# Patient Record
Sex: FEMALE | Race: BLACK OR AFRICAN AMERICAN | Employment: FULL TIME | URBAN - METROPOLITAN AREA
[De-identification: names, ages, dates, MRNs, and addresses within clinical notes are randomized per-mention and may not be internally consistent; named-entity substitution may affect disease eponyms.]

---

## 2017-02-09 DIAGNOSIS — D50.9 IRON DEFICIENCY ANEMIA, UNSPECIFIED IRON DEFICIENCY ANEMIA TYPE: ICD-10-CM

## 2017-02-09 DIAGNOSIS — E89.0 HYPOTHYROIDISM FOLLOWING RADIOIODINE THERAPY: ICD-10-CM

## 2017-02-09 DIAGNOSIS — Z98.84 STATUS POST GASTRIC BYPASS FOR OBESITY: Primary | ICD-10-CM

## 2017-02-15 ENCOUNTER — OFFICE VISIT (OUTPATIENT)
Dept: INTERNAL MEDICINE CLINIC | Age: 42
End: 2017-02-15

## 2017-02-15 VITALS
BODY MASS INDEX: 37.45 KG/M2 | DIASTOLIC BLOOD PRESSURE: 70 MMHG | WEIGHT: 233 LBS | HEART RATE: 95 BPM | RESPIRATION RATE: 16 BRPM | SYSTOLIC BLOOD PRESSURE: 110 MMHG | HEIGHT: 66 IN | OXYGEN SATURATION: 99 % | TEMPERATURE: 98 F

## 2017-02-15 DIAGNOSIS — J01.01 ACUTE RECURRENT MAXILLARY SINUSITIS: Primary | ICD-10-CM

## 2017-02-15 RX ORDER — AZITHROMYCIN 250 MG/1
TABLET, FILM COATED ORAL
Qty: 6 TAB | Refills: 0 | Status: SHIPPED | OUTPATIENT
Start: 2017-02-15 | End: 2017-07-13 | Stop reason: ALTCHOICE

## 2017-02-15 NOTE — PATIENT INSTRUCTIONS
It was a pleasure to see you! As discussed:    Upper respiratory tract infection (URI)  You have a bacterial URI. - Take the antibiotics as prescribed. -Use nasal saline spray 3-4 times/day  -Start taking a non sedating antihistamine such as Claritin, Allegra or Zyrtec (generic is fine)  For your  Symptoms:  -Sore throat: Cepacol or similar lozenges, Salt water gargles  -Itching: Thick creams/ lotions; Nonsedating antihistamine such as Allegra, Zyrtec, or Claritin (generic is fine)  -Pain/ Aches: You can use Ibuprofen (no more than 2400 mg/day) or Aleve (no more than 880mg/ day) as needed. Do not use any other NSAIDs while on this medication. Do not use NSAIDs if you have high blood pressure or history of ulcers or abnormal bleeding. OR   -Take Tylenol as needed for headache and body aches (every 4-8 hours, do not use more than 3000mg in one day)       Upper Respiratory Infection (Cold): Care Instructions  Your Care Instructions    An upper respiratory infection, or URI, is an infection of the nose, sinuses, or throat. URIs are spread by coughs, sneezes, and direct contact. The common cold is the most frequent kind of URI. The flu and sinus infections are other kinds of URIs. Almost all URIs are caused by viruses. Antibiotics won't cure them. But you can treat most infections with home care. This may include drinking lots of fluids and taking over-the-counter pain medicine. You will probably feel better in 4 to 10 days. The doctor has checked you carefully, but problems can develop later. If you notice any problems or new symptoms, get medical treatment right away. Follow-up care is a key part of your treatment and safety. Be sure to make and go to all appointments, and call your doctor if you are having problems. It's also a good idea to know your test results and keep a list of the medicines you take. How can you care for yourself at home?   · To prevent dehydration, drink plenty of fluids, enough so that your urine is light yellow or clear like water. Choose water and other caffeine-free clear liquids until you feel better. If you have kidney, heart, or liver disease and have to limit fluids, talk with your doctor before you increase the amount of fluids you drink. · Take an over-the-counter pain medicine, such as acetaminophen (Tylenol), ibuprofen (Advil, Motrin), or naproxen (Aleve). Read and follow all instructions on the label. · Before you use cough and cold medicines, check the label. These medicines may not be safe for young children or for people with certain health problems. · Be careful when taking over-the-counter cold or flu medicines and Tylenol at the same time. Many of these medicines have acetaminophen, which is Tylenol. Read the labels to make sure that you are not taking more than the recommended dose. Too much acetaminophen (Tylenol) can be harmful. · Get plenty of rest.  · Do not smoke or allow others to smoke around you. If you need help quitting, talk to your doctor about stop-smoking programs and medicines. These can increase your chances of quitting for good. When should you call for help? Call 911 anytime you think you may need emergency care. For example, call if:  · You have severe trouble breathing. Call your doctor now or seek immediate medical care if:  · You seem to be getting much sicker. · You have new or worse trouble breathing. · You have a new or higher fever. · You have a new rash. Watch closely for changes in your health, and be sure to contact your doctor if:  · You have a new symptom, such as a sore throat, an earache, or sinus pain. · You cough more deeply or more often, especially if you notice more mucus or a change in the color of your mucus. · You do not get better as expected. Where can you learn more? Go to http://donna-gustavo.info/.   Enter G726 in the search box to learn more about \"Upper Respiratory Infection (Cold): Care Instructions. \"  Current as of: June 30, 2016  Content Version: 11.1  © 6732-4537 [employer], [facility]. Care instructions adapted under license by [employer] (which disclaims liability or warranty for this information). If you have questions about a medical condition or this instruction, always ask your healthcare professional. [place] [address] any warranty or liability for your use of this information.

## 2017-02-15 NOTE — LETTER
NOTIFICATION RETURN TO WORK / SCHOOL 
 
2/15/2017 1:33 PM 
 
Ms. Veena Madden [place] [address] [facility] [address] To Whom It May Concern: 
 
Veena Madden is currently under the care of [facility]. She will return to work/school on: 2/17/17 If there are questions or concerns please have the patient contact our office. Sincerely, Conchis Sheppard MD

## 2017-02-15 NOTE — PROGRESS NOTES
HISTORY OF PRESENT ILLNESS  Sanjuana Lockwood is a 46 y.o. female. Cold Symptoms   The current episode started more than 2 days ago. The cough is productive of purulent sputum. There has been no fever. Associated symptoms include rhinorrhea and sore throat. Pertinent negatives include no chest pain, no chills, no ear pain, no headaches, no shortness of breath, no wheezing, no nausea and no vomiting. Treatments tried: Robitussin  Risk factors: sick contacts at work. She is not a smoker. Past medical history comments: sinusitis . Review of Systems   Constitutional: Negative for chills. HENT: Positive for rhinorrhea and sore throat. Negative for ear pain. Respiratory: Negative for shortness of breath and wheezing. Cardiovascular: Negative for chest pain. Gastrointestinal: Negative for nausea and vomiting. Neurological: Negative for headaches. Patient Active Problem List    Diagnosis Date Noted    Iron deficiency anemia 10/29/2015    Hypothyroidism following radioiodine therapy 04/21/2015    Status post gastric bypass for obesity 01/27/2011    Heartburn 01/27/2011    Headache(784.0) 01/27/2011    Chronic low back pain 01/27/2011       Current Outpatient Prescriptions   Medication Sig Dispense Refill    levothyroxine (SYNTHROID) 50 mcg tablet TAKE THREE TABLETS BY MOUTH MOST DAYS AND TAKE TWO TABLETS ON MONDAY, WEDNESDAY, FRIDAY AVERAGE DOSE .5 MCG/DAY 90 Tab 6    cetirizine (ZYRTEC) 10 mg tablet Take 1 Tab by mouth daily. 30 Tab 0    norgestimate-ethinyl estradiol (SPRINTEC, 28,) 0.25-35 mg-mcg per tablet Take 1 Tab by mouth daily. 1 Each 0    ergocalciferol (ERGOCALCIFEROL) 50,000 unit capsule Take 1 Cap by mouth two (2) times a week on Wednesday and Saturday. 24 Cap 3    ascorbic acid (VITAMIN C) 500 mg tablet Take  by mouth.  MULTIVITAMINS W-IRON (MULTI-VITAMIN WITH IRON PO) Take  by mouth.       azithromycin (ZITHROMAX) 250 mg tablet Take 1 Tab by mouth See Admin Instructions. 6 Tab 0    guaiFENesin-codeine (CHERATUSSIN AC) 100-10 mg/5 mL solution Take 5 mL by mouth three (3) times daily as needed for Cough. Max Daily Amount: 15 mL. 118 mL 0    cyclobenzaprine (FLEXERIL) 5 mg tablet Take 1 Tab by mouth three (3) times daily as needed for Muscle Spasm(s). Take first dose at night 30 Tab 1    butalbital-acetaminophen-caffeine (FIORICET, ESGIC) -40 mg per tablet Take 1 Tab by mouth every eight (8) hours as needed for Pain. Max Daily Amount: 3 Tabs. Try 1st dose at night  Indications: TENSION-TYPE HEADACHE 30 Tab 0       Allergies   Allergen Reactions    Levothyroxine Rash     Reacts to dye in high dose tablets    Sulfa (Sulfonamide Antibiotics) Hives      Visit Vitals    /70 (BP 1 Location: Right arm, BP Patient Position: Sitting)    Pulse 95    Temp 98 °F (36.7 °C) (Oral)    Resp 16    Ht 5' 6\" (1.676 m)    Wt 233 lb (105.7 kg)    SpO2 99%    BMI 37.61 kg/m2       Physical Exam   Constitutional: She is oriented to person, place, and time. She appears well-developed. No distress. HENT:   Nose: Mucosal edema present. No rhinorrhea or septal deviation. Right sinus exhibits no maxillary sinus tenderness and no frontal sinus tenderness. Left sinus exhibits no maxillary sinus tenderness and no frontal sinus tenderness. Eyes: Conjunctivae are normal.   Neck: Neck supple. Cardiovascular: Normal rate, regular rhythm and normal heart sounds. Pulmonary/Chest: Effort normal and breath sounds normal. No respiratory distress. She has no wheezes. She has no rales. She exhibits no tenderness. Lymphadenopathy:     She has no cervical adenopathy. Neurological: She is alert and oriented to person, place, and time. Psychiatric: She has a normal mood and affect. ASSESSMENT and PLAN  Po Perez was seen today for sinus infection.  Likely bacterial given s/s     Diagnoses and all orders for this visit:    Acute recurrent maxillary sinusitis  Upper respiratory tract infection (URI)  You have a bacterial URI. - Take the antibiotics as prescribed. -Use nasal saline spray 3-4 times/day  -Start taking a non sedating antihistamine such as Claritin, Allegra or Zyrtec (generic is fine)  For your  Symptoms:  -Sore throat: Cepacol or similar lozenges, Salt water gargles  -Itching: Thick creams/ lotions; Nonsedating antihistamine such as Allegra, Zyrtec, or Claritin (generic is fine)  -Pain/ Aches: You can use Ibuprofen (no more than 2400 mg/day) or Aleve (no more than 880mg/ day) as needed. Do not use any other NSAIDs while on this medication. Do not use NSAIDs if you have high blood pressure or history of ulcers or abnormal bleeding. OR   -Take Tylenol as needed for headache and body aches (every 4-8 hours, do not use more than 3000mg in one day)    -     azithromycin (ZITHROMAX) 250 mg tablet; Day 1 take 2 tabs by mouth; Days 2-5 take one tab by mouth a day      Follow-up Disposition:  Return if symptoms worsen or fail to improve. Medication risks/benefits/costs/interactions/alternatives discussed with patient. Fer Wileykins  was given an after visit summary which includes diagnoses, current medications, & vitals. she expressed understanding with the diagnosis and plan.

## 2017-04-11 LAB
ALBUMIN SERPL-MCNC: 3.9 G/DL (ref 3.5–5.5)
ALBUMIN/GLOB SERPL: 1.6 {RATIO} (ref 1.2–2.2)
ALP SERPL-CCNC: 77 IU/L (ref 39–117)
ALT SERPL-CCNC: 16 IU/L (ref 0–32)
AST SERPL-CCNC: 22 IU/L (ref 0–40)
BASOPHILS # BLD AUTO: 0 X10E3/UL (ref 0–0.2)
BASOPHILS NFR BLD AUTO: 0 %
BILIRUB SERPL-MCNC: 0.6 MG/DL (ref 0–1.2)
BUN SERPL-MCNC: 9 MG/DL (ref 6–20)
BUN/CREAT SERPL: 9 (ref 9–23)
CALCIUM SERPL-MCNC: 8.3 MG/DL (ref 8.7–10.2)
CHLORIDE SERPL-SCNC: 102 MMOL/L (ref 96–106)
CHOLEST SERPL-MCNC: 133 MG/DL (ref 100–199)
CO2 SERPL-SCNC: 21 MMOL/L (ref 18–29)
CREAT SERPL-MCNC: 1.03 MG/DL (ref 0.57–1)
EOSINOPHIL # BLD AUTO: 0 X10E3/UL (ref 0–0.4)
EOSINOPHIL NFR BLD AUTO: 0 %
ERYTHROCYTE [DISTWIDTH] IN BLOOD BY AUTOMATED COUNT: 13.3 % (ref 12.3–15.4)
FERRITIN SERPL-MCNC: 17 NG/ML (ref 15–150)
GLOBULIN SER CALC-MCNC: 2.4 G/DL (ref 1.5–4.5)
GLUCOSE SERPL-MCNC: 79 MG/DL (ref 65–99)
HCT VFR BLD AUTO: 39 % (ref 34–46.6)
HDLC SERPL-MCNC: 69 MG/DL
HGB BLD-MCNC: 12.5 G/DL (ref 11.1–15.9)
IMM GRANULOCYTES # BLD: 0 X10E3/UL (ref 0–0.1)
IMM GRANULOCYTES NFR BLD: 0 %
LDLC SERPL CALC-MCNC: 53 MG/DL (ref 0–99)
LYMPHOCYTES # BLD AUTO: 1.8 X10E3/UL (ref 0.7–3.1)
LYMPHOCYTES NFR BLD AUTO: 34 %
MCH RBC QN AUTO: 31.4 PG (ref 26.6–33)
MCHC RBC AUTO-ENTMCNC: 32.1 G/DL (ref 31.5–35.7)
MCV RBC AUTO: 98 FL (ref 79–97)
MONOCYTES # BLD AUTO: 0.5 X10E3/UL (ref 0.1–0.9)
MONOCYTES NFR BLD AUTO: 9 %
NEUTROPHILS # BLD AUTO: 3 X10E3/UL (ref 1.4–7)
NEUTROPHILS NFR BLD AUTO: 57 %
PLATELET # BLD AUTO: 251 X10E3/UL (ref 150–379)
POTASSIUM SERPL-SCNC: 4.3 MMOL/L (ref 3.5–5.2)
PROT SERPL-MCNC: 6.3 G/DL (ref 6–8.5)
RBC # BLD AUTO: 3.98 X10E6/UL (ref 3.77–5.28)
SODIUM SERPL-SCNC: 139 MMOL/L (ref 134–144)
TRIGL SERPL-MCNC: 54 MG/DL (ref 0–149)
TSH SERPL DL<=0.005 MIU/L-ACNC: 1.71 UIU/ML (ref 0.45–4.5)
VIT B12 SERPL-MCNC: 1175 PG/ML (ref 211–946)
VLDLC SERPL CALC-MCNC: 11 MG/DL (ref 5–40)
WBC # BLD AUTO: 5.3 X10E3/UL (ref 3.4–10.8)

## 2017-04-13 ENCOUNTER — OFFICE VISIT (OUTPATIENT)
Dept: INTERNAL MEDICINE CLINIC | Age: 42
End: 2017-04-13

## 2017-04-13 VITALS
HEIGHT: 66 IN | SYSTOLIC BLOOD PRESSURE: 118 MMHG | TEMPERATURE: 98.2 F | WEIGHT: 237.4 LBS | BODY MASS INDEX: 38.15 KG/M2 | OXYGEN SATURATION: 99 % | DIASTOLIC BLOOD PRESSURE: 84 MMHG | HEART RATE: 69 BPM | RESPIRATION RATE: 16 BRPM

## 2017-04-13 DIAGNOSIS — R63.5 WEIGHT GAIN: ICD-10-CM

## 2017-04-13 DIAGNOSIS — M25.551 PAIN OF RIGHT HIP JOINT: ICD-10-CM

## 2017-04-13 DIAGNOSIS — Z00.00 WELL WOMAN EXAM (NO GYNECOLOGICAL EXAM): Primary | ICD-10-CM

## 2017-04-13 DIAGNOSIS — Z98.84 STATUS POST GASTRIC BYPASS FOR OBESITY: ICD-10-CM

## 2017-04-13 DIAGNOSIS — D50.9 IRON DEFICIENCY ANEMIA, UNSPECIFIED IRON DEFICIENCY ANEMIA TYPE: ICD-10-CM

## 2017-04-13 DIAGNOSIS — E89.0 HYPOTHYROIDISM FOLLOWING RADIOIODINE THERAPY: ICD-10-CM

## 2017-04-13 NOTE — PROGRESS NOTES
Lab Review   Your labs are clinically normal.   The remainder of your labs were normal. Some labs that may have been tested and their explanation are:  Your electrolytes, kidney & liver function (Metabolic Panel)   Anemia, blood cells (CBC)  Thyroid (TSH + T4, T3)  Hormones (prolactin, vitamin D )   Diabetes (Hemoglobin A1c)

## 2017-04-13 NOTE — PROGRESS NOTES
HISTORY OF PRESENT ILLNESS  Jeremías Silva is a 46 y.o. female. Hip Pain    Episode onset: 2 months ago started kickboxing, had  episode where she felt right  hip pain. The pain is present in the right hip. The quality of the pain is described as aching. The pain is at a severity of 8/10. Pertinent negatives include no numbness, full range of motion, no stiffness and no back pain. Treatments tried: stretching, ice and epsom salt  Family history is significant for rheumatoid arthritis. Health Maintenance   Topic Date Due    PAP AKA CERVICAL CYTOLOGY  09/01/2015    INFLUENZA AGE 9 TO ADULT  08/01/2016    DTaP/Tdap/Td series (2 - Td) 10/09/2022     Cardiovascular Review:  The patient has obesity s/p gastric bypass 2009. Body mass index is 38.38 kg/(m^2). Stress: High  SABRINA:  +Snoring, Unknown if apneic episodes + Fatigue in Day. STOP BAG- 4   Barrier: Emotional Eating  Sleep: <7hrs- due to racing thoughts   Lowest post bypass weight 198  Most weight regain within the past 2 yrs. Diet and Lifestyle: exercises regularly, nonsmoker  Home BP Monitoring: is not measured at home. Pertinent ROS: no TIA's, no chest pain on exertion, no dyspnea on exertion, no swelling of ankles. Review of Systems   Musculoskeletal: Negative for back pain and stiffness. Neurological: Negative for numbness. Patient Active Problem List    Diagnosis Date Noted    Iron deficiency anemia 10/29/2015    Hypothyroidism following radioiodine therapy 04/21/2015    Status post gastric bypass for obesity 01/27/2011    Heartburn 01/27/2011    Headache 01/27/2011    Chronic low back pain 01/27/2011       Current Outpatient Prescriptions   Medication Sig Dispense Refill    levothyroxine (SYNTHROID) 50 mcg tablet TAKE THREE TABLETS BY MOUTH MOST DAYS AND TAKE TWO TABLETS ON MONDAY, WEDNESDAY, FRIDAY AVERAGE DOSE .5 MCG/DAY 90 Tab 6    cetirizine (ZYRTEC) 10 mg tablet Take 1 Tab by mouth daily.  30 Tab 0    norgestimate-ethinyl estradiol ([facility], ,) 0.25-35 mg-mcg per tablet Take 1 Tab by mouth daily. 1 Each 0    ergocalciferol (ERGOCALCIFEROL) 50,000 unit capsule Take 1 Cap by mouth two (2) times a week on Wednesday and Saturday. 24 Cap 3    MULTIVITAMINS W-IRON (MULTI-VITAMIN WITH IRON PO) Take  by mouth.  azithromycin (ZITHROMAX) 250 mg tablet Day 1 take 2 tabs by mouth; Days 2-5 take one tab by mouth a day 6 Tab 0    ascorbic acid (VITAMIN C) 500 mg tablet Take  by mouth. Allergies   Allergen Reactions    Levothyroxine Rash     Reacts to dye in high dose tablets    Sulfa (Sulfonamide Antibiotics) Hives      Visit Vitals    /84 (BP 1 Location: Right arm, BP Patient Position: Sitting)    Pulse 69    Temp 98.2 °F (36.8 °C) (Oral)    Resp 16    Ht 5' 5.95\" (1.675 m)    Wt 237 lb 6.4 oz (107.7 kg)    SpO2 99%    BMI 38.38 kg/m2         Physical Exam  Lab Results  Component Value Date/Time   WBC 5.3 04/10/2017 08:40 AM   HGB 12.5 04/10/2017 08:40 AM   HCT 39.0 04/10/2017 08:40 AM   PLATELET 407 30/92/0848 08:40 AM   MCV 98 04/10/2017 08:40 AM       Lab Results  Component Value Date/Time   Glucose 79 04/10/2017 08:40 AM   Glucose (POC) 106 07/29/2009 04:21 PM   LDL, calculated 53 04/10/2017 08:40 AM   Creatinine 1.03 04/10/2017 08:40 AM      Lab Results  Component Value Date/Time   Cholesterol, total 133 04/10/2017 08:40 AM   HDL Cholesterol 69 04/10/2017 08:40 AM   LDL, calculated 53 04/10/2017 08:40 AM   Triglyceride 54 04/10/2017 08:40 AM   CHOL/HDL Ratio 2.9 02/04/2009 10:34 AM       Lab Results  Component Value Date/Time   ALT (SGPT) 16 04/10/2017 08:40 AM   AST (SGOT) 22 04/10/2017 08:40 AM   Alk.  phosphatase 77 04/10/2017 08:40 AM   Bilirubin, total 0.6 04/10/2017 08:40 AM       Lab Results  Component Value Date/Time   GFR est AA 79 04/10/2017 08:40 AM   GFR est non-AA 69 04/10/2017 08:40 AM   Creatinine 1.03 04/10/2017 08:40 AM   BUN 9 04/10/2017 08:40 AM   Sodium 139 04/10/2017 08:40 AM   Potassium 4.3 04/10/2017 08:40 AM   Chloride 102 04/10/2017 08:40 AM   CO2 21 04/10/2017 08:40 AM      Lab Results  Component Value Date/Time   TSH 1.710 04/10/2017 08:40 AM   TSH 2.110 10/12/2016 12:00 AM   T4, Free 1.35 10/12/2016 12:00 AM   T4, Total <0.5 04/04/2011 04:21 AM      Lab Results   Component Value Date/Time    Glucose 79 04/10/2017 08:40 AM    Glucose (POC) 106 07/29/2009 04:21 PM         ASSESSMENT and PLAN  Yu Fish was seen today for complete physical and hip pain. Diagnoses and all orders for this visit:    Well woman exam (no gynecological exam)- Health Maintenance reviewed     Status post gastric bypass for obesity- I have reviewed/discussed the above normal BMI with the patient. I have recommended the following interventions: dietary management education, guidance, and counseling . Wilton Alonso Weight gain- optimize diet. See above. See AVS for full details of plan and patient discussion. Hypothyroidism following radioiodine therapy- TFTs at goals     Iron deficiency anemia, unspecified iron deficiency anemia type    Pain of right hip joint- Bursitis based on s/s. RICE if no improvement--> Sports Medicine referral     Follow-up Disposition:  3months- weight management     Medication risks/benefits/costs/interactions/alternatives discussed with patient. Jose Tamiahossein  was given an after visit summary which includes diagnoses, current medications, & vitals. she expressed understanding with the diagnosis and plan.

## 2017-04-13 NOTE — PATIENT INSTRUCTIONS
It was a pleasure to see you! As discussed: Three Goals (Big Goal: at least  3-5 lbs weight loss by next visit)  . [facility]    Lab Review   Your thyroid studies are at goal. Continue your current levothyroxine dose. We will recheck the levels in 6-12 months or sooner if you develop symptoms or sudden weight changes. Your labs are clinically normal.   The remainder of your labs were normal. Some labs that may have been tested and their explanation are:  Your electrolytes, kidney & liver function (Metabolic Panel)   Anemia, blood cells (CBC)  Thyroid (TSH + T4, T3)  Hormones (prolactin, vitamin D )   Diabetes (Hemoglobin A1c)           Hip Bursitis: Care Instructions  Your Care Instructions    Bursitis is inflammation of the bursa. A bursa is a small sac of fluid that cushions a joint and helps it move easily. A bursa sits between a bone in the hip and the muscles and tendons in the thigh and buttock. Injury or overuse of the hip can cause bursitis. Activities that can lead to bursitis include twisting and rapid joint movement. Bursitis can cause hip pain. Bursitis usually gets better if you avoid the activity that caused it. If pain lasts or gets worse despite home treatment, your doctor may draw fluid from the bursa through a needle. This may relieve your pain and help your doctor know if you have an infection. If so, your doctor will prescribe antibiotics. If you have inflammation only, you may get a corticosteroid shot to reduce swelling and pain. Sometimes surgery is needed to drain or remove the bursa. Follow-up care is a key part of your treatment and safety. Be sure to make and go to all appointments, and call your doctor if you are having problems. It's also a good idea to know your test results and keep a list of the medicines you take. How can you care for yourself at home? · Put ice or a cold pack on your hip for 10 to 20 minutes at a time.  Put a thin cloth between the ice and your skin.  · After 3 days of using ice, you may use heat on your hip. You can use a hot water bottle, a heating pad set on low, or a warm, moist towel. · Rest your hip. Stop any activities that cause pain. Switch to activities that do not stress your hip. · Take your medicines exactly as prescribed. Call your doctor if you think you are having a problem with your medicine. · Ask your doctor if you can take an over-the-counter pain medicine, such as acetaminophen (Tylenol), ibuprofen (Advil, Motrin), or naproxen (Aleve). Be safe with medicines. Read and follow all instructions on the label. · To prevent stiffness, gently move the hip joint as much as you can without pain every day. As the pain gets better, keep doing range-of-motion exercises. Ask your doctor for exercises that will make the muscles around the hip joint stronger. Do these as directed. · You can slowly return to the activity that caused the pain, but do it with less effort until you can do it without pain or swelling. Be sure to warm up before and stretch after you do the activity. When should you call for help? Call your doctor now or seek immediate medical care if:  · You have a fever. · You have increased swelling or redness in your hip. · You cannot use your hip, or the pain in your hip gets worse. Watch closely for changes in your health, and be sure to contact your doctor if:  · You have pain for 2 weeks or longer despite home treatment. Where can you learn more? Go to http://donna-gustavo.info/. Enter K463 in the search box to learn more about \"Hip Bursitis: Care Instructions. \"  Current as of: May 23, 2016  Content Version: 11.2  © 8997-3527 [employer]. Care instructions adapted under license by [employer] (which disclaims liability or warranty for this information).  If you have questions about a medical condition or this instruction, always ask your healthcare professional. [employer] disclaims any warranty or liability for your use of this information. Hip Bursitis: Exercises  Your Care Instructions  Here are some examples of typical rehabilitation exercises for your condition. Start each exercise slowly. Ease off the exercise if you start to have pain. Your doctor or physical therapist will tell you when you can start these exercises and which ones will work best for you. How to do the exercises  Hip rotator stretch    1. Lie on your back with both knees bent and your feet flat on the floor. 2. Put the ankle of your affected leg on your opposite thigh near your knee. 3. Use your hand to gently push your knee away from your body until you feel a gentle stretch around your hip. 4. Hold the stretch for 15 to 30 seconds. 5. Repeat 2 to 4 times. 6. Repeat steps 1 through 5, but this time use your hand to gently pull your knee toward your opposite shoulder. Iliotibial band stretch    1. Lean sideways against a wall. If you are not steady on your feet, hold on to a chair or counter. 2. Stand on the leg with the affected hip, with that leg close to the wall. Then cross your other leg in front of it. 3. Let your affected hip drop out to the side of your body and against wall. Then lean away from your affected hip until you feel a stretch. 4. Hold the stretch for 15 to 30 seconds. 5. Repeat 2 to 4 times. Straight-leg raises to the outside    1. Lie on your side, with your affected hip on top. 2. Tighten the front thigh muscles of your top leg to keep your knee straight. 3. Keep your hip and your leg straight in line with the rest of your body, and keep your knee pointing forward. Do not drop your hip back. 4. Lift your top leg straight up toward the ceiling, about 12 inches off the floor. Hold for about 6 seconds, then slowly lower your leg. 5. Repeat 8 to 12 times. Clamshell    1. Lie on your side, with your affected hip on top and your head propped on a pillow.  Keep your feet and knees together and your knees bent. 2. Raise your top knee, but keep your feet together. Do not let your hips roll back. Your legs should open up like a clamshell. 3. Hold for 6 seconds. 4. Slowly lower your knee back down. Rest for 10 seconds. 5. Repeat 8 to 12 times. Follow-up care is a key part of your treatment and safety. Be sure to make and go to all appointments, and call your doctor if you are having problems. It's also a good idea to know your test results and keep a list of the medicines you take. Where can you learn more? Go to http://donna-gustavo.info/. Enter K265 in the search box to learn more about \"Hip Bursitis: Exercises. \"  Current as of: May 23, 2016  Content Version: 11.2  © 8091-2423 [employer]. Care instructions adapted under license by [employer] (which disclaims liability or warranty for this information). If you have questions about a medical condition or this instruction, always ask your healthcare professional. [place] [address] any warranty or liability for your use of this information. Well Visit, Ages 25 to 48: Care Instructions  Your Care Instructions  Physical exams can help you stay healthy. Your doctor has checked your overall health and may have suggested ways to take good care of yourself. He or she also may have recommended tests. At home, you can help prevent illness with healthy eating, regular exercise, and other steps. Follow-up care is a key part of your treatment and safety. Be sure to make and go to all appointments, and call your doctor if you are having problems. It's also a good idea to know your test results and keep a list of the medicines you take. How can you care for yourself at home? · Reach and stay at a healthy weight. This will lower your risk for many problems, such as obesity, diabetes, heart disease, and high blood pressure.   · Get at least 30 minutes of physical activity on most days of the week. Walking is a good choice. You also may want to do other activities, such as running, swimming, cycling, or playing tennis or team sports. Discuss any changes in your exercise program with your doctor. · Do not smoke or allow others to smoke around you. If you need help quitting, talk to your doctor about stop-smoking programs and medicines. These can increase your chances of quitting for good. · Talk to your doctor about whether you have any risk factors for sexually transmitted infections (STIs). Having one sex partner (who does not have STIs and does not have sex with anyone else) is a good way to avoid these infections. · Use birth control if you do not want to have children at this time. Talk with your doctor about the choices available and what might be best for you. · Protect your skin from too much sun. When you're outdoors from 10 a.m. to 4 p.m., stay in the shade or cover up with clothing and a hat with a wide brim. Wear sunglasses that block UV rays. Even when it's cloudy, put broad-spectrum sunscreen (SPF 30 or higher) on any exposed skin. · See a dentist one or two times a year for checkups and to have your teeth cleaned. · Wear a seat belt in the car. · Drink alcohol in moderation, if at all. That means no more than 2 drinks a day for men and 1 drink a day for women. Follow your doctor's advice about when to have certain tests. These tests can spot problems early. For everyone  · Cholesterol. Have the fat (cholesterol) in your blood tested after age 21. Your doctor will tell you how often to have this done based on your age, family history, or other things that can increase your risk for heart disease. · Blood pressure. Have your blood pressure checked during a routine doctor visit. Your doctor will tell you how often to check your blood pressure based on your age, your blood pressure results, and other factors. · Vision.  Talk with your doctor about how often to have a glaucoma test.  · Diabetes. Ask your doctor whether you should have tests for diabetes. · Colon cancer. Have a test for colon cancer at age 48. You may have one of several tests. If you are younger than 48, you may need a test earlier if you have any risk factors. Risk factors include whether you already had a precancerous polyp removed from your colon or whether your parent, brother, sister, or child has had colon cancer. For women  · Breast exam and mammogram. Talk to your doctor about when you should have a clinical breast exam and a mammogram. Medical experts differ on whether and how often women under 50 should have these tests. Your doctor can help you decide what is right for you. · Pap test and pelvic exam. Begin Pap tests at age 24. A Pap test is the best way to find cervical cancer. The test often is part of a pelvic exam. Ask how often to have this test.  · Tests for sexually transmitted infections (STIs). Ask whether you should have tests for STIs. You may be at risk if you have sex with more than one person, especially if your partners do not wear condoms. For men  · Tests for sexually transmitted infections (STIs). Ask whether you should have tests for STIs. You may be at risk if you have sex with more than one person, especially if you do not wear a condom. · Testicular cancer exam. Ask your doctor whether you should check your testicles regularly. · Prostate exam. Talk to your doctor about whether you should have a blood test (called a PSA test) for prostate cancer. Experts differ on whether and when men should have this test. Some experts suggest it if you are older than 39 and are -American or have a father or brother who got prostate cancer when he was younger than 72. When should you call for help? Watch closely for changes in your health, and be sure to contact your doctor if you have any problems or symptoms that concern you. Where can you learn more?   Go to http://donna-gustavo.info/. Enter P072 in the search box to learn more about \"Well Visit, Ages 25 to 48: Care Instructions. \"  Current as of: July 19, 2016  Content Version: 11.2  © 7819-8135 [employer]. Care instructions adapted under license by [employer] (which disclaims liability or warranty for this information). If you have questions about a medical condition or this instruction, always ask your healthcare professional. [place] [address] any warranty or liability for your use of this information.

## 2017-07-13 ENCOUNTER — OFFICE VISIT (OUTPATIENT)
Dept: INTERNAL MEDICINE CLINIC | Age: 42
End: 2017-07-13

## 2017-07-13 ENCOUNTER — HOSPITAL ENCOUNTER (OUTPATIENT)
Dept: GENERAL RADIOLOGY | Age: 42
Discharge: HOME | End: 2017-07-13
Payer: COMMERCIAL

## 2017-07-13 VITALS
HEIGHT: 66 IN | OXYGEN SATURATION: 99 % | TEMPERATURE: 98.3 F | SYSTOLIC BLOOD PRESSURE: 116 MMHG | DIASTOLIC BLOOD PRESSURE: 88 MMHG | WEIGHT: 229 LBS | BODY MASS INDEX: 36.8 KG/M2 | HEART RATE: 70 BPM | RESPIRATION RATE: 12 BRPM

## 2017-07-13 DIAGNOSIS — M25.552 PAIN OF LEFT HIP JOINT: ICD-10-CM

## 2017-07-13 DIAGNOSIS — M25.552 PAIN OF LEFT HIP JOINT: Primary | ICD-10-CM

## 2017-07-13 DIAGNOSIS — E89.0 HYPOTHYROIDISM FOLLOWING RADIOIODINE THERAPY: ICD-10-CM

## 2017-07-13 DIAGNOSIS — R63.4 WEIGHT LOSS: ICD-10-CM

## 2017-07-13 PROCEDURE — 73502 X-RAY EXAM HIP UNI 2-3 VIEWS: CPT

## 2017-07-13 NOTE — PROGRESS NOTES
HISTORY OF PRESENT ILLNESS  Bony Harris is a 46 y.o. female. HPI  Cardiovascular Review:  The patient has obesity and hypothryoidism. Diet and Lifestyle: exercises regularly, has resumed with ; Having protein shake for MRT- in AM   Home BP Monitoring: is not measured at home. Pertinent ROS: taking medications as instructed, no medication side effects noted, no TIA's, no chest pain on exertion, no dyspnea on exertion, no swelling of ankles. Hip Pain    Recurrent Episode onset: 5 months ago started kickboxing, had  episode where she felt left hip pain. The pain is present in the left hip. The quality of the pain is described as aching. The pain is at a severity of 4/10. Pertinent negatives include no numbness, full range of motion, no stiffness and no back pain. Treatments tried: stretching, ice and epsom salt  Family history is significant for rheumatoid arthritis. Review of Systems   Constitutional: Negative for diaphoresis, fever and weight loss. Eyes: Negative for blurred vision and pain. Respiratory: Negative for shortness of breath. Cardiovascular: Negative for chest pain, orthopnea and leg swelling. Musculoskeletal: Positive for joint pain. Neurological: Negative for focal weakness and headaches. Psychiatric/Behavioral: Negative for depression. Patient Active Problem List    Diagnosis Date Noted    Iron deficiency anemia 10/29/2015    Hypothyroidism following radioiodine therapy 04/21/2015    Status post gastric bypass for obesity 01/27/2011    Heartburn 01/27/2011    Headache 01/27/2011    Chronic low back pain 01/27/2011       Current Outpatient Prescriptions   Medication Sig Dispense Refill    levothyroxine (SYNTHROID) 50 mcg tablet TAKE THREE TABLETS BY MOUTH MOST DAYS AND TAKE TWO TABLETS ON MONDAY, WEDNESDAY, FRIDAY AVERAGE DOSE .5 MCG/DAY 90 Tab 6    cetirizine (ZYRTEC) 10 mg tablet Take 1 Tab by mouth daily.  (Patient taking differently: Take 10 mg by mouth daily as needed.) 30 Tab 0    norgestimate-ethinyl estradiol (SPRINTEC, 28,) 0.25-35 mg-mcg per tablet Take 1 Tab by mouth daily. 1 Each 0    ergocalciferol (ERGOCALCIFEROL) 50,000 unit capsule Take 1 Cap by mouth two (2) times a week on Wednesday and Saturday. 24 Cap 3    ascorbic acid (VITAMIN C) 500 mg tablet Take  by mouth.  MULTIVITAMINS W-IRON (MULTI-VITAMIN WITH IRON PO) Take  by mouth. Allergies   Allergen Reactions    Levothyroxine Rash     Reacts to dye in high dose tablets    Sulfa (Sulfonamide Antibiotics) Hives      Visit Vitals    /88    Pulse 70    Temp 98.3 °F (36.8 °C) (Oral)    Resp 12    Ht 5' 5.95\" (1.675 m)    Wt 229 lb (103.9 kg)    SpO2 99%    BMI 37.02 kg/m2       Physical Exam   Constitutional: She is oriented to person, place, and time. No distress. Cardiovascular: Normal rate, regular rhythm and normal heart sounds. Pulmonary/Chest: Breath sounds normal. No respiratory distress. She has no wheezes. She has no rales. Musculoskeletal:        Left hip: She exhibits tenderness. She exhibits normal range of motion, normal strength and no bony tenderness. Legs:  Pain in ABduction    Neurological: She is alert and oriented to person, place, and time. Psychiatric: She has a normal mood and affect. ASSESSMENT and PLAN  Michelle Pappas was seen today for hypothyroidism and weight management. Diagnoses and all orders for this visit:    Pain of left hip joint- RICE would benefit from Seeing Sports Medicine. Xray ordered. -     REFERRAL TO SPORTS MEDICINE    Hypothyroidism following radioiodine therapy- recent TFTs at goal     Weight loss- lost 8lbs. I have reviewed/discussed the above normal BMI with the patient. I have recommended the following interventions: dietary management education, guidance, and counseling . Carlos Toscano           Follow-up Disposition:  Return in about 6 months (around 1/13/2018) for Physical - 30 minute appointment. Medication risks/benefits/costs/interactions/alternatives discussed with patient. Marianna Daniel  was given an after visit summary which includes diagnoses, current medications, & vitals. she expressed understanding with the diagnosis and plan.

## 2017-07-13 NOTE — MR AVS SNAPSHOT
Visit Information Date & Time Provider Department Dept. Phone Encounter #  
 7/13/2017  7:45 AM Rudy Sanchez MD [facility] Internal Medicine [phone] [id number] Follow-up Instructions Return in about 6 months (around 1/13/2018) for Physical - 30 minute appointment. Upcoming Health Maintenance Date Due INFLUENZA AGE 9 TO ADULT 8/1/2017 PAP AKA CERVICAL CYTOLOGY 3/6/2020 DTaP/Tdap/Td series (2 - Td) 10/9/2022 Allergies as of 7/13/2017  Review Complete On: 7/13/2017 By: Rudy Sanchez MD  
  
 Severity Noted Reaction Type Reactions Levothyroxine  10/06/2009    Rash Reacts to dye in high dose tablets Sulfa (Sulfonamide Antibiotics)  10/06/2009    Hives Current Immunizations  Reviewed on 7/13/2017 Name Date TDAP Vaccine 10/9/2012 Reviewed by Alondra Martinez RN on 7/13/2017 at  7:54 AM  
You Were Diagnosed With   
  
 Codes Comments Pain of left hip joint    -  Primary ICD-10-CM: M25.552 ICD-9-CM: 719.45 Hypothyroidism following radioiodine therapy     ICD-10-CM: E89.0 ICD-9-CM: 244.1 Weight loss     ICD-10-CM: R63.4 ICD-9-CM: 783.21 Vitals BP Pulse Temp Resp Height(growth percentile) Weight(growth percentile) 116/88 70 98.3 °F (36.8 °C) (Oral) 12 5' 5.95\" (1.675 m) 229 lb (103.9 kg) LMP SpO2 BMI OB Status Smoking Status 06/20/2017 99% 37.02 kg/m2 Having regular periods Never Smoker Vitals History BMI and BSA Data Body Mass Index Body Surface Area 37.02 kg/m 2 2.2 m 2 Preferred Pharmacy Pharmacy Name Phone [facility] [address] [place] [address] [phone] Your Updated Medication List  
  
   
This list is accurate as of: 7/13/17  8:22 AM.  Always use your most recent med list.  
  
  
  
  
 cetirizine 10 mg tablet Commonly known as:  ZYRTEC Take 1 Tab by mouth daily. ergocalciferol 50,000 unit capsule Commonly known as:  ERGOCALCIFEROL Take 1 Cap by mouth two (2) times a week on Wednesday and Saturday. levothyroxine 50 mcg tablet Commonly known as:  SYNTHROID  
TAKE THREE TABLETS BY MOUTH MOST DAYS AND TAKE TWO TABLETS ON MONDAY, WEDNESDAY, FRIDAY AVERAGE DOSE .5 MCG/DAY MULTI-VITAMIN WITH IRON PO Take  by mouth. norgestimate-ethinyl estradiol 0.25-35 mg-mcg Tab Commonly known as:  [facility] () Take 1 Tab by mouth daily. VITAMIN C 500 mg tablet Generic drug:  ascorbic acid (vitamin C) Take  by mouth. We Performed the Following REFERRAL TO SPORTS MEDICINE [[id number] Custom] Follow-up Instructions Return in about 6 months (around 1/13/2018) for Physical - 30 minute appointment. Referral Information Referral ID Referred By Referred To  
  
 [id number] MD Julio César [place], [address] Phone: [phone] Fax: [phone] Visits Status Start Date End Date 1 New Request 7/13/17 7/13/18 If your referral has a status of pending review or denied, additional information will be sent to support the outcome of this decision. Patient Instructions It was a pleasure to see you! As discussed: 
 
Congratulations on your weight loss and positive lifestyle changes!!! Hip Pain: Care Instructions Your Care Instructions Hip pain may be caused by many things, including overuse, a fall, or a twisting movement. Another cause of hip pain is arthritis. Your pain may increase when you stand up, walk, or squat. The pain may come and go or may be constant. Home treatment can help relieve hip pain, swelling, and stiffness. If your pain is ongoing, you may need more tests and treatment. Follow-up care is a key part of your treatment and safety.  Be sure to make and go to all appointments, and call your doctor if you are having problems. Its also a good idea to know your test results and keep a list of the medicines you take. How can you care for yourself at home? · Take pain medicines exactly as directed. ¨ If the doctor gave you a prescription medicine for pain, take it as prescribed. ¨ If you are not taking a prescription pain medicine, ask your doctor if you can take an over-the-counter medicine. · Rest and protect your hip. Take a break from any activity, including standing or walking, that may cause pain. · Put ice or a cold pack against your hip for 10 to 20 minutes at a time. Try to do this every 1 to 2 hours for the next 3 days (when you are awake) or until the swelling goes down. Put a thin cloth between the ice and your skin. · Sleep on your healthy side with a pillow between your knees, or sleep on your back with pillows under your knees. · If there is no swelling, you can put moist heat, a heating pad, or a warm cloth on your hip. Do gentle stretching exercises to help keep your hip flexible. · Learn how to prevent falls. Have your vision and hearing checked regularly. Wear slippers or shoes with a nonskid sole. · Stay at a healthy weight. · Wear comfortable shoes. When should you call for help? Call 911 anytime you think you may need emergency care. For example, call if: 
· You have sudden chest pain and shortness of breath, or you cough up blood. · You are not able to stand or walk or bear weight. · Your buttocks, legs, or feet feel numb or tingly. · Your leg or foot is cool or pale or changes color. · You have severe pain. Call your doctor now or seek immediate medical care if: 
· You have signs of infection, such as: 
¨ Increased pain, swelling, warmth, or redness in the hip area. ¨ Red streaks leading from the hip area. ¨ Pus draining from the hip area. ¨ A fever. · You have signs of a blood clot, such as: 
¨ Pain in your calf, back of the knee, thigh, or groin. ¨ Redness and swelling in your leg or groin. · You are not able to bend, straighten, or move your leg normally. · You have trouble urinating or having bowel movements. Watch closely for changes in your health, and be sure to contact your doctor if: 
· You do not get better as expected. Where can you learn more? Go to http://donna-gustavo.info/. Enter X487 in the search box to learn more about \"Hip Pain: Care Instructions. \" Current as of: March 20, 2017 Content Version: 11.3 © 8739-5627 [employer]. Care instructions adapted under license by [employer] (which disclaims liability or warranty for this information). If you have questions about a medical condition or this instruction, always ask your healthcare professional. [address] any warranty or liability for your use of this information. Introducing [facility] & HEALTH SERVICES! Dear Kenneth Coreas: Thank you for requesting a SimpleSite account. Our records indicate that you already have an active SimpleSite account. You can access your account anytime at [email or web address] Did you know that you can access your hospital and ER discharge instructions at any time in SimpleSite? You can also review all of your test results from your hospital stay or ER visit. Additional Information If you have questions, please visit the Frequently Asked Questions section of the SimpleSite website at [email or web address] Remember, SimpleSite is NOT to be used for urgent needs. For medical emergencies, dial 911. Now available from your iPhone and Android! Please provide this summary of care documentation to your next provider. Your primary care clinician is listed as Nora Maki. If you have any questions after today's visit, please call [phone].

## 2017-07-13 NOTE — COMMUNICATION BODY
It was a pleasure to see you!   As discussed:    Congratulations on your weight loss and positive lifestyle changes!!!

## 2017-07-13 NOTE — PATIENT INSTRUCTIONS
It was a pleasure to see you! As discussed:    Congratulations on your weight loss and positive lifestyle changes!!! Hip Pain: Care Instructions  Your Care Instructions  Hip pain may be caused by many things, including overuse, a fall, or a twisting movement. Another cause of hip pain is arthritis. Your pain may increase when you stand up, walk, or squat. The pain may come and go or may be constant. Home treatment can help relieve hip pain, swelling, and stiffness. If your pain is ongoing, you may need more tests and treatment. Follow-up care is a key part of your treatment and safety. Be sure to make and go to all appointments, and call your doctor if you are having problems. Its also a good idea to know your test results and keep a list of the medicines you take. How can you care for yourself at home? · Take pain medicines exactly as directed. ¨ If the doctor gave you a prescription medicine for pain, take it as prescribed. ¨ If you are not taking a prescription pain medicine, ask your doctor if you can take an over-the-counter medicine. · Rest and protect your hip. Take a break from any activity, including standing or walking, that may cause pain. · Put ice or a cold pack against your hip for 10 to 20 minutes at a time. Try to do this every 1 to 2 hours for the next 3 days (when you are awake) or until the swelling goes down. Put a thin cloth between the ice and your skin. · Sleep on your healthy side with a pillow between your knees, or sleep on your back with pillows under your knees. · If there is no swelling, you can put moist heat, a heating pad, or a warm cloth on your hip. Do gentle stretching exercises to help keep your hip flexible. · Learn how to prevent falls. Have your vision and hearing checked regularly. Wear slippers or shoes with a nonskid sole. · Stay at a healthy weight. · Wear comfortable shoes. When should you call for help?   Call 911 anytime you think you may need emergency care. For example, call if:  · You have sudden chest pain and shortness of breath, or you cough up blood. · You are not able to stand or walk or bear weight. · Your buttocks, legs, or feet feel numb or tingly. · Your leg or foot is cool or pale or changes color. · You have severe pain. Call your doctor now or seek immediate medical care if:  · You have signs of infection, such as:  ¨ Increased pain, swelling, warmth, or redness in the hip area. ¨ Red streaks leading from the hip area. ¨ Pus draining from the hip area. ¨ A fever. · You have signs of a blood clot, such as:  ¨ Pain in your calf, back of the knee, thigh, or groin. ¨ Redness and swelling in your leg or groin. · You are not able to bend, straighten, or move your leg normally. · You have trouble urinating or having bowel movements. Watch closely for changes in your health, and be sure to contact your doctor if:  · You do not get better as expected. Where can you learn more? Go to http://donna-gustavo.info/. Enter N083 in the search box to learn more about \"Hip Pain: Care Instructions. \"  Current as of: March 20, 2017  Content Version: 11.3  © 4313-3418 [employer]. Care instructions adapted under license by [employer] (which disclaims liability or warranty for this information). If you have questions about a medical condition or this instruction, always ask your healthcare professional. [place] [address] any warranty or liability for your use of this information.

## 2017-07-13 NOTE — PROGRESS NOTES
Hi Ms. Catrachita Sears,   It was a pleasure to see you today. Your xray does not show arthritis or fracture. I recommend you continue the plan to see Dr. Leanne Rosado at [facility] Medicine as we discussed. Continue the treatment for discussed in clinic. Do not hesitate to contact the office if you have any questions or concerns before your next appointment.   Kind regards,   Dr. Anaya Fuse

## 2017-07-20 ENCOUNTER — OFFICE VISIT (OUTPATIENT)
Dept: INTERNAL MEDICINE CLINIC | Age: 42
End: 2017-07-20

## 2017-07-20 VITALS
WEIGHT: 220 LBS | HEIGHT: 65 IN | DIASTOLIC BLOOD PRESSURE: 79 MMHG | TEMPERATURE: 97.8 F | BODY MASS INDEX: 36.65 KG/M2 | HEART RATE: 60 BPM | RESPIRATION RATE: 16 BRPM | OXYGEN SATURATION: 100 % | SYSTOLIC BLOOD PRESSURE: 126 MMHG

## 2017-07-20 DIAGNOSIS — S39.011A STRAIN OF RECTUS ABDOMINIS MUSCLE, INITIAL ENCOUNTER: Primary | ICD-10-CM

## 2017-07-20 RX ORDER — PREDNISONE 20 MG/1
20 TABLET ORAL 3 TIMES DAILY
Qty: 21 TAB | Refills: 0 | Status: SHIPPED | OUTPATIENT
Start: 2017-07-20 | End: 2018-01-18

## 2017-07-20 NOTE — PROGRESS NOTES
Chief Complaint   Patient presents with    Hip Pain     Left hip pain x 5 months     she is a 46y.o. year old female who presents for evaluation of Right Hip pain caused from kick boxing. Pain Assessment Encounter      Andrea José  7/20/2017  Onset of Symptoms: 5 months ago  ________________________________________________________________________  Description: Leveda Landau     Frequency: 2-3 times a day  Pain Scale:(1-10): 7  Trauma Hx: None   Hx of similar symptoms: No:   Radiation: None  Duration:  intermittent      Progression: is unchanged  What makes it better?: walking  What makes it worse?:sitting and laying down  Medications tried: None    Reviewed and agree with Nurse Note and duplicated in this note. Reviewed PmHx, RxHx, FmHx, SocHx, AllgHx and updated and dated in the chart.     Family History   Problem Relation Age of Onset   Kiesha Crespo Arthritis-rheumatoid Mother     Cancer Father 72     colon    Hypertension Father     High Cholesterol Father     Heart Attack Father        Past Medical History:   Diagnosis Date    Chronic constipation 1/27/2011    Chronic joint pain 1/27/2011    Chronic low back pain 1/27/2011    Headache 1/27/2011    Heartburn 1/27/2011    Hypothyroid     Morbid obesity ([facility] [address] 1/27/2011    Pap smear for cervical cancer screening 9/12    Status post gastric bypass for obesity 1/27/2011 7/2009      Social History     Social History    Marital status: SINGLE     Spouse name: Single    Number of children: 0    Years of education: N/A     Occupational History    [employer] [address], [employer]      Social History Main Topics    Smoking status: Never Smoker    Smokeless tobacco: Never Used    Alcohol use Yes      Comment: rarely only-maybe one drink/month    Drug use: No    Sexual activity: Yes     Partners: Male     Birth control/ protection: Pill     Other Topics Concern    Not on file     Social History Narrative        Review of Systems - negative except as listed above      Objective:     Vitals:    07/20/17 1505   BP: 126/79   Pulse: 60   Resp: 16   Temp: 97.8 °F (36.6 °C)   TempSrc: Oral   SpO2: 100%   Weight: 220 lb (99.8 kg)   Height: 5' 5\" (1.651 m)       Physical Examination: General appearance - alert, well appearing, and in no distress  Back exam - full range of motion, no tenderness, palpable spasm or pain on motion  Neurological - alert, oriented, normal speech, no focal findings or movement disorder noted  Musculoskeletal -   MSK - Hip left:    Deformity: None    ROM:     Flexion: Normal    Extension: Normal     Internal/external rotation: Normal      Gait: Normal       Palpation:    L1-L5: Negative tenderness    Sacrum: Negative tenderness    Coccyx: Negativetenderness    Left Paraspinal: Negativetenderness    Right Paraspinal: Negativetenderness    Greater trochanter: Negativetenderness    Ischial Tuberosity: Negativetenderness    Piriformis: Negativetenderness       Strength (0-5/5)    Hip Flexion:  Left: 5/5  Right: 5/5    Hip Extension: Left: 5/5  Right: 5/5    Hip Abduction: Left: 5/5  Right: 5/5    Hip Adduction: Left: 5/5  Right: 5/5    Knee Extension: Left: 5/5  Right: 5/5    Knee Flexion:  Left: 5/5  Right: 5/5    One leg squat:       Sensation: Intact, no deficits      DTR:    Patella:2       Achilles: 2   Special test:    Straight leg:Negative     Trinas:Negative     Piriformis:Negative     FADIR is Negative    '    Extremities - peripheral pulses normal, no pedal edema, no clubbing or cyanosis  Skin - normal coloration and turgor, no rashes, no suspicious skin lesions noted    Assessment/ Plan:   Faith Beaulieu was seen today for hip pain. Diagnoses and all orders for this visit:    Strain of rectus abdominis muscle, initial encounter  -     XR HIP LT W OR WO PELV 2-3 VWS; Future  -     REFERRAL TO PHYSICAL THERAPY    Other orders  -     predniSONE (DELTASONE) 20 mg tablet; Take 1 Tab by mouth three (3) times daily.      pain is not re-created with any testing in clinic today. Based on her history and where she is stating her pain is, ultrasound done of the rectus insertion on the iliac crest.  Ultrasound shows a small defect in the transverse rectus muscle with no appreciable hernia. Consider MRI if no resolution with physical therapy    Pathophysiology, recovery and rehabilitation process discussed and questions answered   Counseling for 30 Minutes of the total visit duration   Pictures and figures used as necessary   Provided reassurance   Handouts provided and reviewed with patient for core stretching  Monitor response to Physical Therapy   Recommend activity modification   Recommend  lower impact activities-walking, Eliptical, Nordic Track, cycling or swimming   Follow up in 4 week(s)           I have discussed the diagnosis with the patient and the intended plan as seen in the above orders. The patient has received an after-visit summary and questions were answered concerning future plans. Medication Side Effects and Warnings were discussed with patient: yes  Patient Labs were reviewed and or requested: yes  Patient Past Records were reviewed and or requested  yes  I have discussed the diagnosis with the patient and the intended plan as seen in the above orders. The patient has received an after-visit summary and questions were answered concerning future plans. Pt agrees to call or return to clinic and/or go to closest ER with any worsening of symptoms. This may include, but not limited to increased fever (>100.4) with NSAIDS or Tylenol, increased edema, confusion, rash, worsening of presenting symptoms. 1) Remember to stay active and/or exercise regularly (I suggest 30-45 minutes daily)   2) For reliable dietary information, go to www. EATRIGHT.org. You may wish to consider seeing the nutritionist at [facility] at #[phone] or [phone], also consider the [address]   3) I routinely suggest a complete physical exam once each year (your birth month)

## 2017-07-20 NOTE — MR AVS SNAPSHOT
Visit Information Date & Time Provider Department Dept. Phone Encounter #  
 7/20/2017  3:00 PM Janie Hines MD [facility] Sports Medicine and [place] [id number] [id number] Your Appointments 1/18/2018  7:45 AM  
ROUTINE CARE with Colletta Stains, MD  
[address] [facility] Internal Medicine [address]) Appt Note: [place] [address] [facility] [id number] [place], [place] [address] [address] Upcoming Health Maintenance Date Due INFLUENZA AGE 9 TO ADULT 8/1/2017 PAP AKA CERVICAL CYTOLOGY 3/6/2020 DTaP/Tdap/Td series (2 - Td) 10/9/2022 Allergies as of 7/20/2017  Review Complete On: 7/20/2017 By: Neli Villar Severity Noted Reaction Type Reactions Levothyroxine  10/06/2009    Rash Reacts to dye in high dose tablets Sulfa (Sulfonamide Antibiotics)  10/06/2009    Hives Current Immunizations  Reviewed on 7/13/2017 Name Date TDAP Vaccine 10/9/2012 Not reviewed this visit You Were Diagnosed With   
  
 Codes Comments Strain of rectus abdominis muscle, initial encounter    -  Primary ICD-10-CM: B36.840K ICD-9-CM: 903. 8 Vitals BP Pulse Temp Resp Height(growth percentile) Weight(growth percentile) 126/79 (BP 1 Location: Right arm, BP Patient Position: Sitting) 60 97.8 °F (36.6 °C) (Oral) 16 5' 5\" (1.651 m) 220 lb (99.8 kg) LMP SpO2 BMI OB Status Smoking Status 06/20/2017 100% 36.61 kg/m2 Having regular periods Never Smoker BMI and BSA Data Body Mass Index Body Surface Area  
 36.61 kg/m 2 2.14 m 2 Preferred Pharmacy Pharmacy Name Phone [facility] [address] [place] [address] [phone] Your Updated Medication List  
  
   
This list is accurate as of: 7/20/17  3:36 PM.  Always use your most recent med list.  
  
  
  
  
 cetirizine 10 mg tablet Commonly known as:  ZYRTEC  
 Take 1 Tab by mouth daily. ergocalciferol 50,000 unit capsule Commonly known as:  ERGOCALCIFEROL Take 1 Cap by mouth two (2) times a week on Wednesday and Saturday. levothyroxine 50 mcg tablet Commonly known as:  SYNTHROID  
TAKE THREE TABLETS BY MOUTH MOST DAYS AND TAKE TWO TABLETS ON MONDAY, WEDNESDAY, FRIDAY AVERAGE DOSE .5 MCG/DAY MULTI-VITAMIN WITH IRON PO Take  by mouth. norgestimate-ethinyl estradiol 0.25-35 mg-mcg Tab Commonly known as:  [address] [facility] (28) Take 1 Tab by mouth daily. predniSONE 20 mg tablet Commonly known as:  Guzmán Aver Take 1 Tab by mouth three (3) times daily. VITAMIN C 500 mg tablet Generic drug:  ascorbic acid (vitamin C) Take  by mouth. Prescriptions Sent to Pharmacy Refills  
 predniSONE (DELTASONE) 20 mg tablet 0 Sig: Take 1 Tab by mouth three (3) times daily. Class: Normal  
 Pharmacy: [address] Ph #: [phone] Route: Oral  
  
We Performed the Following REFERRAL TO PHYSICAL THERAPY [XBS38 Custom] To-Do List   
 07/20/2017 Imaging:  XR HIP LT W OR WO PELV 2-3 VWS Referral Information Referral ID Referred By Referred To  
  
 [id number] [facility]   
   [address] Phone: [phone] Visits Status Start Date End Date  
 20 New Request 7/20/17 7/20/18 If your referral has a status of pending review or denied, additional information will be sent to support the outcome of this decision. Introducing [facility]! Dear Naz Hicks: Thank you for requesting a [employer] account. Our records indicate that you already have an active [employer] account. You can access your account anytime at [email or web address] Did you know that you can access your hospital and ER discharge instructions at any time in [employer]? You can also review all of your test results from your hospital stay or ER visit. Additional Information If you have questions, please visit the Frequently Asked Questions section of the [employer] website at [email or web address] Remember, CoachClub is NOT to be used for urgent needs. For medical emergencies, dial 911. Now available from your iPhone and Android! Please provide this summary of care documentation to your next provider. Your primary care clinician is listed as Estela Doe. If you have any questions after today's visit, please call [phone].

## 2017-08-03 ENCOUNTER — HOSPITAL ENCOUNTER (OUTPATIENT)
Dept: PHYSICAL THERAPY | Age: 42
Discharge: HOME | End: 2017-08-03
Payer: COMMERCIAL

## 2017-08-03 PROCEDURE — 97162 PT EVAL MOD COMPLEX 30 MIN: CPT | Performed by: PHYSICAL THERAPIST

## 2017-08-03 PROCEDURE — 97535 SELF CARE MNGMENT TRAINING: CPT | Performed by: PHYSICAL THERAPIST

## 2017-08-03 NOTE — PROGRESS NOTES
[facility]  [address]  [place], [address]  Phone: [phone]  Fax: [phone]    Plan of Care/Statement of Necessity for Physical Therapy Services  2-15    Patient name: Kat Martin  : 1975  Provider#: [id number]  Referral source: Apurva Spence MD      Medical/Treatment Diagnosis: Strain of rectus abdominis muscle [S39.011A]     Prior Hospitalization: see medical history     Comorbidities: see evaluation  Prior Level of Function:see evaluation  Medications: Verified on Patient Summary List  Start of Care: 8/3/2017     Onset Date:see evaluation   The Plan of Care and following information is based on the information from the initial evaluation. Assessment/ key information: Patient presents with signs and symptoms consistent with (L) rectus abdominis strain + possible (L) hip derangement and will benefit from physical therapy to address deficits noted below in problem list.     Evaluation Complexity History MEDIUM  Complexity : 1-2 comorbidities / personal factors will impact the outcome/ POC ; Examination MEDIUM Complexity : 3 Standardized tests and measures addressing body structure, function, activity limitation and / or participation in recreation  ;Presentation MEDIUM Complexity : Evolving with changing characteristics  ; Clinical Decision Making MEDIUM Complexity : FOTO score of 26-74  Overall Complexity Rating: MEDIUM    Problem List: pain affecting function, decrease ROM, decrease strength, decrease ADL/ functional abilitiies, decrease activity tolerance, decrease flexibility/ joint mobility and decrease transfer abilities   Treatment Plan may include any combination of the following: Therapeutic exercise, Therapeutic activities, Neuromuscular re-education, Physical agent/modality, Manual therapy, Patient education and Self Care training  Patient / Family readiness to learn indicated by: asking questions, trying to perform skills and interest  Persons(s) to be included in education: patient (P)  Barriers to Learning/Limitations: None  Patient Goal (s): please see evaluation in Connect [facility]  Patient Self Reported Health Status: please see paper chart  Rehabilitation Potential: good    Short Term Goals: To be accomplished in 5 treatments:  -Independent in HEP as evidenced on ability to perform at least 5 exercises from HEP using proper form without verbal cuing.   -Pain less than or equal to 6-7/10 at worst to allow patient to perform ADL's with greater ease  -Pt will report compliance with icing 1-2x/day in order to decrease inflammation  -Pain with sleeping decreased at least 25% to allow pt to get more restful sleep    Long Term Goals: To be accomplished in 10 treatments:  -MMT (R) hip IR 5/5 pain-free to allow pt to return to kickboxing  -Full curl up x 10 without pain to allow pt to return to weight lifting  -FOTO score greater than or equal to 77 to allow patient to perform a greater amount of ADLs. Frequency / Duration: Patient to be seen 2 times per week for 8-10 weeks. Patient/ Caregiver education and instruction: self care, activity modification and exercises    [x]  Plan of care has been reviewed with PTA    Certification Period: 8/3/2017 -  11/1/17    Paula Heard. Jaylin PT, DPT, CMTPT      9/1/0550 5:34 PM  PT License Number: [id number]  _____________________________________________________________________    I certify that the above Therapy Services are being furnished while the patient is under my care. I agree with the treatment plan and certify that this therapy is necessary.     [de-identified] Signature:____________________  Date:____________Time:_________

## 2017-08-03 NOTE — PROGRESS NOTES
PT INITIAL EVALUATION NOTE - [facility] 2-15    Patient Name: Rubén Maldonado  Date:8/3/2017  : 1975  [x]  Patient  Verified  Payor: BLUE CROSS / Plan: [facility] PPO / Product Type: PPO /    In time 405 Out time:500  Total Treatment Time (min): 55  Total Timed Codes (min): 45 (30 eval, 15 timed see below)    Visit #: 1    Treatment Area: Strain of rectus abdominis muscle [S39.011A]    SUBJECTIVE  Any medication changes, allergies to medications, adverse drug reactions, diagnosis change, or new procedure performed?: [] No    [x] Yes (see summary sheet for update)  Date of onset: 2017  CLARISA: pt was kickboxing-did a roundhouse kick  Felt \"something\" but it was not painful  Did not feel pain until she was asked to run at the end of the session  Pain:   6-8(night)/10 max 0/10 min 0/10 now     Location of symptoms: (L) ant hip-does not radiate (feels deep in joint)  Description of symptoms: sharp, burning  Aggravated by:    After walking/working out  No pain with working out  Trying to get comfortable at night  Rolling off of (L) sdie  Seated position to standing   Eased by: tylenol  Prior tests/injections:  x-ray of hip-neg for pathology  US showed transverse abdominus small tear (L) side  Prior treatment: float therapy-helped, massages 1x/mo  PMH:  hypothyroidism  Any weakness in LE's: non  Any tingling/numbness in LE's: none  Any clicking/popping/giving way: none  Recent weight/loss or weight gain: none  Occupation: Administrative Duties with [employer]  Prior level of function/activity level: Has held on Navmiiing for now, still doing cardio & weight lifting with  2x/week  Patient goal: Continue exercising    OBJECTIVE    Observation/Shift/Alignment:  Som genu valgus    Gait: WNL         ROM: AROM lumbar spine all planes, no pain  (L) hip AROM WNL all planes except pain with end range IR    Strength  *5/5 unless noted below   Hip    Flexion    Extension 4+/5 Abduction 4+/5   Adduction    ER    IR 4+/5 \"twinge\"     Pain upon lying to supine    Pain in (R) SL     Pain with full curl-up     Tenderness to palpation:  NONE    Special Tests:   Talita Shed Test:  [x] Neg    [] Pos  Bassem Test:  [x] Neg    [] Pos  Scour Test:  [x] Neg    [] Pos  Trendelenberg:  [] Neg    [x] Pos   OberTest:   [x] Neg    [] Pos  Ely's Test:  [x] Neg    [] Pos  Faddir Test:   [] Neg    [] Pos  Resisted SLR for labral pathology: [x] Neg    [] Pos  SIJ Provocation: [x] Neg    [] Pos  Quadrant Test: [x] Neg    [] Pos  Hamstring 90/90 Test: [x] Neg    [] Pos    Squat: Good post weight shift, increased forward trunk lean    SLS: 30 + sec isai      Flexibility Deficits:   \"feel twinge\" on way to full abd all others WNL    WNL HS and piriformis    Joint mobility: To be assessed next visit        Outcome Measure: Using standardized self-reported disability survey (Focus on Therapeutic Outcomes) the patient's perceived disability score is 68 - zero is the most disabled and 100 is the least disabled. OBJECTIVE    Modality rationale: decrease inflammation, decrease pain and prevent soreness to improve the patients ability to perform ADL's.    Min Type Additional Details    [x]  Ice     []  Heat   Position:supine, isai LE's supported  Location:(L) ant hip/rectus abdominus     [x] Skin assessment post-treatment:  [x]intact []redness- no adverse reaction    []redness  adverse reaction:     15 min Self Care:  [x] See flow sheet :   Rationale: increase ROM and increase strength to improve the patients ability to workout and sleep without pain          With   [x] selfcare   [] TA   [] neuro   [] manual: Patient Education: [x] Review HEP    [] Progressed/Changed HEP based on:   [] positioning   [] body mechanics   [] transfers   [x] Ice application- pt advised to ice 10-15 min 1-2 x/day to area in order to dec inflammation  [x] other:  re: mechanism of injury/condition, role of physical therapy, prognosis for recovery, heat vs ice, activity modifications-pt to hold on cardio/strength training with  for now. OK to do gentle walks. Education re: attempting to sleep on (L) side with pillow between knees. Pain Level (0-10 scale) post treatment: 0    ASSESSMENT/Changes in Function:     [x]  See Plan of Kenneth.  Jaylin PT, DPT, CHITRAPT  PT License Number: [id number]   8/3/2017  3:59 PM

## 2017-08-04 ENCOUNTER — APPOINTMENT (OUTPATIENT)
Dept: PHYSICAL THERAPY | Age: 42
End: 2017-08-04
Payer: COMMERCIAL

## 2017-08-07 ENCOUNTER — HOSPITAL ENCOUNTER (OUTPATIENT)
Dept: PHYSICAL THERAPY | Age: 42
Discharge: HOME | End: 2017-08-07
Payer: COMMERCIAL

## 2017-08-07 PROCEDURE — 97110 THERAPEUTIC EXERCISES: CPT | Performed by: PHYSICAL THERAPY ASSISTANT

## 2017-08-07 NOTE — PROGRESS NOTES
PT DAILY TREATMENT NOTE 2-15    Patient Name: Choco Neal  Date:2017  : 1975  [x]  Patient  Verified  Payor: BLUE CROSS / Plan: Laura Tal[employer] [id number] PPO / Product Type: PPO /    In time:3:10 PM Out time:3:50 PM  Total Treatment Time (min): 40  Visit #: 22     Treatment Area: Strain of rectus abdominis muscle [S39.011A]    SUBJECTIVE  Pain Level (0-10 scale): 0/10  Any medication changes, allergies to medications, adverse drug reactions, diagnosis change, or new procedure performed?: [x] No    [] Yes (see summary sheet for update)  Subjective functional status/changes:   [] No changes reported  Patient reports compliance with postural modifications, holding from exercises at the gym. Reports \"did well\" over the weekend \"I was surprised to have the pain with the crunch test at my first visit because I normally have the pain in the evening after I exercise. \"    OBJECTIVE    Modality rationale: decrease inflammation and decrease pain to improve the patients ability to lift, work out, walking, sleeping at night   Min Type Additional Details    [] Estim: []Att   []Unatt        []TENS instruct                  []IFC  []Premod   []NMES                     []Other:  []w/US   []w/ice   []w/heat  Position:  Location:    []  Traction: [] Cervical       []Lumbar                       [] Prone          []Supine                       []Intermittent   []Continuous Lbs:  [] before manual  [] after manual  []w/heat    []  Ultrasound: []Continuous   [] Pulsed at:                            []1MHz   []3MHz Location:  W/cm2:    []  Paraffin         Location:  []w/heat   10 [x]  Ice     []  Heat  []  Ice massage Position: supine with LE's elevated  Location:L hip    []  Laser  []  Other: Position:  Location:    []  Vasopneumatic Device Pressure:       [] lo [] med [] hi   Temperature:    [x] Skin assessment post-treatment:  [x]intact []redness- no adverse reaction    []redness  adverse reaction:     30 min Therapeutic Exercise:  [x] See flow sheet : added TA brace in HL, with march, prone IR/ER with AB brace, prone iso hip ER with 2 towels with AB brace, bridges, quadruped AB brace with alt UE flexion   Rationale: increase ROM, increase strength and improve coordination to improve the patients ability to lift, work out, walking, sleeping at night          With   [x] TE   [] TA   [] neuro   [] other: Patient Education: [x] Review HEP    [x] Progressed/Changed HEP based on: basic core strengthening exercises  [] positioning   [] body mechanics   [] transfers   [x] heat/ice application    [x] other: reviewed postural principles and gave written handout, activity modification. Other Objective/Functional Measures: nt     Pain Level (0-10 scale) post treatment: 0/10     ASSESSMENT/Changes in Function:   Patient without reports of pain during exercises, cues to maintain neutral spine with quadruped alt. UE flexion. Gave updated HEP, reviewed postural principles and gave handout. Patient will continue to benefit from skilled PT services to modify and progress therapeutic interventions, address functional mobility deficits, address ROM deficits, address strength deficits, analyze and cue movement patterns, analyze and modify body mechanics/ergonomics and instruct in home and community integration to attain remaining goals.      []  See Plan of Care  []  See progress note/recertification  []  See Discharge Summary         Progress towards goals / Updated goals:  nt    PLAN  []  Upgrade activities as tolerated     [x]  Continue plan of care  []  Update interventions per flow sheet       []  Discharge due to:_  []  Other:_      Krystyna Morrison, PTA 8/7/2017  3:10 PM

## 2017-08-09 ENCOUNTER — HOSPITAL ENCOUNTER (OUTPATIENT)
Dept: PHYSICAL THERAPY | Age: 42
Discharge: HOME | End: 2017-08-09
Payer: COMMERCIAL

## 2017-08-09 PROCEDURE — 97110 THERAPEUTIC EXERCISES: CPT | Performed by: PHYSICAL THERAPIST

## 2017-08-09 NOTE — PROGRESS NOTES
PT DAILY TREATMENT NOTE 2-15    Patient Name: Lillette Lennox  Date:2017  : 1975  [x]  Patient  Verified  Payor: BLUE CROSS / Plan: [facility] PPO / Product Type: PPO /    In time:930 A Out time: 1020  Total Treatment Time (min): 50   Timed: 40  Visit #: 3    Treatment Area: Strain of rectus abdominis muscle [S39.011A]    SUBJECTIVE  Pain Level (0-10 scale): 4/10  Any medication changes, allergies to medications, adverse drug reactions, diagnosis change, or new procedure performed?: [x] No    [] Yes (see summary sheet for update)  Subjective functional status/changes:   [] No changes reported   \"I was sore after the exercises, not as sore as when I workout at the gym though. \"    OBJECTIVE    Modality rationale: decrease inflammation and decrease pain to improve the patients ability to lift, work out, walking, sleeping at night   Min Type Additional Details    [] Estim: []Att   []Unatt        []TENS instruct                  []IFC  []Premod   []NMES                     []Other:  []w/US   []w/ice   []w/heat  Position:  Location:    []  Traction: [] Cervical       []Lumbar                       [] Prone          []Supine                       []Intermittent   []Continuous Lbs:  [] before manual  [] after manual  []w/heat    []  Ultrasound: []Continuous   [] Pulsed at:                            []1MHz   []3MHz Location:  W/cm2:    []  Paraffin         Location:  []w/heat   10 [x]  Ice     []  Heat  []  Ice massage Position: supine with LE's elevated  Location:L hip    []  Laser  []  Other: Position:  Location:    []  Vasopneumatic Device Pressure:       [] lo [] med [] hi   Temperature:    [x] Skin assessment post-treatment:  [x]intact []redness- no adverse reaction    []redness  adverse reaction:     40 min Therapeutic Exercise:  [x] See flow sheet attempted addition of clamshells, however pt in (R) SL caused inc (L) hip pain so instead did hooklying tband abd   Rationale: increase ROM, increase strength and improve coordination to improve the patients ability to lift, work out, walking, sleeping at night          With   [x] TE   [] TA   [] neuro   [] other: Patient Education: [x] Review HEP    [x] Progressed/Changed HEP based on: basic core strengthening exercises  [] positioning   [] body mechanics   [] transfers   [x] heat/ice application    [x] other:      Other Objective/Functional Measures:     Pain Level (0-10 scale) post treatment: 0/10     ASSESSMENT/Changes in Function:     Patient will continue to benefit from skilled PT services to modify and progress therapeutic interventions, address functional mobility deficits, address ROM deficits, address strength deficits, analyze and cue movement patterns, analyze and modify body mechanics/ergonomics and instruct in home and community integration to attain remaining goals. []  See Plan of Care  []  See progress note/recertification  []  See Discharge Summary         Progress towards goals / Updated goals:      PLAN  []  Upgrade activities as tolerated     [x]  Continue plan of care  []  Update interventions per flow sheet       []  Discharge due to:_  []  Other:_      Reji Sites.  DEBORAH Mosqueda 8/9/2017  3:10 PM

## 2017-08-11 ENCOUNTER — APPOINTMENT (OUTPATIENT)
Dept: PHYSICAL THERAPY | Age: 42
End: 2017-08-11
Payer: COMMERCIAL

## 2017-08-16 ENCOUNTER — HOSPITAL ENCOUNTER (OUTPATIENT)
Dept: PHYSICAL THERAPY | Age: 42
Discharge: HOME | End: 2017-08-16
Payer: COMMERCIAL

## 2017-08-16 PROCEDURE — 97110 THERAPEUTIC EXERCISES: CPT | Performed by: PHYSICAL THERAPY ASSISTANT

## 2017-08-18 ENCOUNTER — APPOINTMENT (OUTPATIENT)
Dept: PHYSICAL THERAPY | Age: 42
End: 2017-08-18
Payer: COMMERCIAL

## 2017-08-22 ENCOUNTER — HOSPITAL ENCOUNTER (OUTPATIENT)
Dept: PHYSICAL THERAPY | Age: 42
Discharge: HOME | End: 2017-08-22
Payer: COMMERCIAL

## 2017-08-22 PROCEDURE — 97140 MANUAL THERAPY 1/> REGIONS: CPT | Performed by: PHYSICAL THERAPIST

## 2017-08-22 PROCEDURE — 97110 THERAPEUTIC EXERCISES: CPT | Performed by: PHYSICAL THERAPIST

## 2017-08-22 NOTE — PROGRESS NOTES
PT DAILY TREATMENT NOTE 2-15    Patient Name: Jack Garrido  Date:2017  : 1975  [x]  Patient  Verified  Payor: BLUE CROSS / Plan: [employer] [id number] PPO / Product Type: PPO /    In time:4:10 PM Out time:520  Total Treatment Time (min): 70  Timed: 60  Visit #: 5    Treatment Area: Strain of rectus abdominis muscle [S39.011A]    SUBJECTIVE  Pain Level (0-10 scale): 7/10  Any medication changes, allergies to medications, adverse drug reactions, diagnosis change, or new procedure performed?: [x] No    [] Yes (see summary sheet for update)  Subjective functional status/changes:   [] No changes reported    I felt great, no pain at all after last time. I was able to drive to [employer], walk 27,24 steps two days in a row no problem. Pain when I got home, I have no idea why! OBJECTIVE    Modality rationale: decrease inflammation and decrease pain to improve the patients ability to lift, work out, walking, sleeping at night   Min Type Additional Details    [] Estim: []Att   []Unatt        []TENS instruct                  []IFC  []Premod   []NMES                     []Other:  []w/US   []w/ice   []w/heat  Position:  Location:    []  Traction: [] Cervical       []Lumbar                       [] Prone          []Supine                       []Intermittent   []Continuous Lbs:  [] before manual  [] after manual  []w/heat    []  Ultrasound: []Continuous   [] Pulsed at:                            []1MHz   []3MHz Location:  W/cm2:    []  Paraffin         Location:  []w/heat   10 [x]  Ice     []  Heat  []  Ice massage Position: supine with LE's elevated  Location:L hip    []  Laser  []  Other: Position:  Location:    []  Vasopneumatic Device Pressure:       [] lo [] med [] hi   Temperature:    [x] Skin assessment post-treatment:  [x]intact []redness- no adverse reaction    []redness  adverse reaction:     45 min Therapeutic Exercise:  [x] See flow sheet     15 Manual: (L) hip lat and inf glides in supine. Rationale: increase ROM, increase strength and improve coordination to improve the patients ability to lift, work out, walking, sleeping at night          With   [x] TE   [] TA   [] neuro   [] other: Patient Education: [x] Review HEP    [x] Progressed/Changed HEP based on: basic core strengthening exercises  [] positioning   [] body mechanics   [] transfers   [x] heat/ice application    [] other:      Other Objective/Functional Measures:      Full curl up min pain (pt reporting much less than initial eval)  Still no TTP  MMT (L) hip IR 5/5 pain free  (L) hip AROM IR WNL, no pain      Pain Level (0-10 scale) post treatment: 2/10     ASSESSMENT/Changes in Function:       Patient will continue to benefit from skilled PT services to modify and progress therapeutic interventions, address functional mobility deficits, address ROM deficits, address strength deficits, analyze and cue movement patterns, analyze and modify body mechanics/ergonomics and instruct in home and community integration to attain remaining goals. []  See Plan of Care  []  See progress note/recertification  []  See Discharge Summary         Progress towards goals / Updated goals:  Pt with significant improvements in special tests since initial evaluation. Still struggling with flare ups of (L) hip pain which at this time pt seems unable to tie to specific position/activity. Advised to pay attention to prolonged sitting to see if this seems to flare her up. PLAN  []  Upgrade activities as tolerated     [x]  Continue plan of care  []  Update interventions per flow sheet       []  Discharge due to:_  []  Other:_      Charly Mosqueda, PT 8/22/2017  4:00 PM

## 2017-08-24 ENCOUNTER — HOSPITAL ENCOUNTER (OUTPATIENT)
Dept: PHYSICAL THERAPY | Age: 42
Discharge: HOME | End: 2017-08-24
Payer: COMMERCIAL

## 2017-08-24 PROCEDURE — 97140 MANUAL THERAPY 1/> REGIONS: CPT | Performed by: PHYSICAL THERAPIST

## 2017-08-24 PROCEDURE — 97110 THERAPEUTIC EXERCISES: CPT | Performed by: PHYSICAL THERAPIST

## 2017-08-24 NOTE — PROGRESS NOTES
PT DAILY TREATMENT NOTE 2-15    Patient Name: George Madison  Date:2017  : 1975  [x]  Patient  Verified  Payor: BLUE CROSS / Plan: Laura Tal[employer] [id number] PPO / Product Type: PPO /    In time:4:10 PM Out time:530  Total Treatment Time (min):80  Timed: 70  Visit #:6    Treatment Area: Strain of rectus abdominis muscle [S39.011A]    SUBJECTIVE  Pain Level (0-10 scale): 4/10  Any medication changes, allergies to medications, adverse drug reactions, diagnosis change, or new procedure performed?: [x] No    [] Yes (see summary sheet for update)  Subjective functional status/changes:   [] No changes reported    \"Most of pain when I am turning onto my (L) side. Once I'm there I do okay. \"    OBJECTIVE    Modality rationale: decrease inflammation and decrease pain to improve the patients ability to lift, work out, walking, sleeping at night   Min Type Additional Details    [] Estim: []Att   []Unatt        []TENS instruct                  []IFC  []Premod   []NMES                     []Other:  []w/US   []w/ice   []w/heat  Position:  Location:    []  Traction: [] Cervical       []Lumbar                       [] Prone          []Supine                       []Intermittent   []Continuous Lbs:  [] before manual  [] after manual  []w/heat    []  Ultrasound: []Continuous   [] Pulsed at:                            []1MHz   []3MHz Location:  W/cm2:    []  Paraffin         Location:  []w/heat   10 [x]  Ice     []  Heat  []  Ice massage Position: supine with LE's elevated  Location:L hip    []  Laser  []  Other: Position:  Location:    []  Vasopneumatic Device Pressure:       [] lo [] med [] hi   Temperature:    [x] Skin assessment post-treatment:  [x]intact []redness- no adverse reaction    []redness  adverse reaction:     60 min Therapeutic Exercise:  [x] See flow sheet re-assessment included in time    10 Manual: (L) hip lat and inf glides in supine. Quadruped (L) hip mobs with belt, pt doing post rocks. Performed inf/lat glide. Rationale: increase ROM, increase strength and improve coordination to improve the patients ability to lift, work out, walking, sleeping at night          With   [x] TE   [] TA   [] neuro   [] other: Patient Education: [x] Review HEP    [x] Progressed/Changed HEP based on:   [] positioning   [] body mechanics   [] transfers   [x] heat/ice application    [x] other: advised pt to try sleeping with pillow between knees at all time to reduce hip inflammation with turning onto side. Other Objective/Functional Measures:      Full curl up min pain (pt reporting much less than initial eval)  Still no TTP  MMT (L) hip IR 5/5 pain free  (L) hip AROM IR WNL, no pain  (L) hip AROM IR in quadruped WB with resistance-p! Thessaly Test (+): NO CHANGE with inf/lat glide in supine. NO CHANGE with post/inf glide in quadruped with belt    Pain Level (0-10 scale) post treatment: 0-1/10     ASSESSMENT/Changes in Function:         Progress towards goals / Updated goals:    Pt with symptoms of hip impingement/microinstability vs labral tear. Will benefit from a few more sessions with focus on stability training. If no change plan to refer back to Dr. Mani Peraza. PLAN  []  Upgrade activities as tolerated     [x]  Continue plan of care  []  Update interventions per flow sheet       []  Discharge due to:_  []  Other:_      Margarito Beltrán.  Jaylin, PT 8/24/2017  4:00 PM

## 2017-08-29 ENCOUNTER — HOSPITAL ENCOUNTER (OUTPATIENT)
Dept: PHYSICAL THERAPY | Age: 42
Discharge: HOME | End: 2017-08-29
Payer: COMMERCIAL

## 2017-08-29 PROCEDURE — 97110 THERAPEUTIC EXERCISES: CPT | Performed by: PHYSICAL THERAPIST

## 2017-08-29 NOTE — PROGRESS NOTES
PT DAILY TREATMENT NOTE 2-15    Patient Name: Karyn Boyle  Date:2017  : 1975  [x]  Patient  Verified  Payor: BLUE CROSS / Plan: [employer] [id number] PPO / Product Type: PPO /    In time:4:25 PM Out time:530  Total Treatment Time (min):65  Timed: 55  Visit #:6    Treatment Area: Strain of rectus abdominis muscle [S39.011A]    SUBJECTIVE  Pain Level (0-10 scale):0/10  Any medication changes, allergies to medications, adverse drug reactions, diagnosis change, or new procedure performed?: [x] No    [] Yes (see summary sheet for update)  Subjective functional status/changes:   [] No changes reported    \"I feel better than ever since last session. Between the exercises and the suggestion of keeping the pillow between my knees throughout the night, I haven't had any pain in like 5 days.  \"    OBJECTIVE    Modality rationale: decrease inflammation and decrease pain to improve the patients ability to lift, work out, walking, sleeping at night   Min Type Additional Details    [] Estim: []Att   []Unatt        []TENS instruct                  []IFC  []Premod   []NMES                     []Other:  []w/US   []w/ice   []w/heat  Position:  Location:    []  Traction: [] Cervical       []Lumbar                       [] Prone          []Supine                       []Intermittent   []Continuous Lbs:  [] before manual  [] after manual  []w/heat    []  Ultrasound: []Continuous   [] Pulsed at:                            []1MHz   []3MHz Location:  W/cm2:    []  Paraffin         Location:  []w/heat   10 [x]  Ice     []  Heat  []  Ice massage Position: supine with LE's elevated  Location:L hip    []  Laser  []  Other: Position:  Location:    []  Vasopneumatic Device Pressure:       [] lo [] med [] hi   Temperature:    [x] Skin assessment post-treatment:  [x]intact []redness- no adverse reaction    []redness  adverse reaction:     55 min Therapeutic Exercise:  [x] See flow sheet      Rationale: increase ROM, increase strength and improve coordination to improve the patients ability to lift, work out, walking, sleeping at night          With   [x] TE   [] TA   [] neuro   [] other: Patient Education: [x] Review HEP    [x] Progressed/Changed HEP based on:   [] positioning   [] body mechanics   [] transfers   [x] heat/ice application    [] other:      Other Objective/Functional Measures:      Pain Level (0-10 scale) post treatment: 0-1/10     ASSESSMENT/Changes in Function:         Progress towards goals / Updated goals:  Pt progressing very well with stability program.  Laz progression as noted on chart without pain. PLAN  []  Upgrade activities as tolerated     [x]  Continue plan of care  []  Update interventions per flow sheet       []  Discharge due to:_  []  Other:_      Matilde Rangel.  Jaylin, PT 8/29/2017  4:00 PM

## 2017-08-31 ENCOUNTER — APPOINTMENT (OUTPATIENT)
Dept: PHYSICAL THERAPY | Age: 42
End: 2017-08-31
Payer: COMMERCIAL

## 2017-09-01 ENCOUNTER — HOSPITAL ENCOUNTER (OUTPATIENT)
Dept: PHYSICAL THERAPY | Age: 42
Discharge: HOME | End: 2017-09-01
Payer: COMMERCIAL

## 2017-09-01 PROCEDURE — 97110 THERAPEUTIC EXERCISES: CPT | Performed by: PHYSICAL THERAPIST

## 2017-09-01 NOTE — PROGRESS NOTES
PT DAILY TREATMENT NOTE 2-15    Patient Name: Al Herrera  Date:2017  : 1975  [x]  Patient  Verified  Payor: BLUE CROSS / Plan: [facility] PPO / Product Type: PPO /    In time:800 AM  Out time:900  Total Treatment Time (min):60  Timed: 50  Visit #8    Treatment Area: Strain of rectus abdominis muscle [S39.011A]    SUBJECTIVE  Pain Level (0-10 scale):0/10  Any medication changes, allergies to medications, adverse drug reactions, diagnosis change, or new procedure performed?: [x] No    [] Yes (see summary sheet for update)  Subjective functional status/changes:   [] No changes reported    \"I haven't had any pain at all.    I can't believe how much the exercises have helped\"    OBJECTIVE    Modality rationale: decrease inflammation and decrease pain to improve the patients ability to lift, work out, walking, sleeping at night   Min Type Additional Details    [] Estim: []Att   []Unatt        []TENS instruct                  []IFC  []Premod   []NMES                     []Other:  []w/US   []w/ice   []w/heat  Position:  Location:    []  Traction: [] Cervical       []Lumbar                       [] Prone          []Supine                       []Intermittent   []Continuous Lbs:  [] before manual  [] after manual  []w/heat    []  Ultrasound: []Continuous   [] Pulsed at:                            []1MHz   []3MHz Location:  W/cm2:    []  Paraffin         Location:  []w/heat   10 [x]  Ice     []  Heat  []  Ice massage Position: supine with LE's elevated  Location:L hip    []  Laser  []  Other: Position:  Location:    []  Vasopneumatic Device Pressure:       [] lo [] med [] hi   Temperature:    [x] Skin assessment post-treatment:  [x]intact []redness- no adverse reaction    []redness  adverse reaction:     50 min Therapeutic Exercise:  [x] See flow sheet      Rationale: increase ROM, increase strength and improve coordination to improve the patients ability to lift, work out, walking, sleeping at night          With   [x] TE   [] TA   [] neuro   [] other: Patient Education: [x] Review HEP    [x] Progressed/Changed HEP based on:   [] positioning   [] body mechanics   [] transfers   [x] heat/ice application    [x] other: HEP reviewed. Pt given this therapist's contact phone and e-mail and advised to call with any questions or concerns in the future. Other Objective/Functional Measures:   Using standardized self-reported disability survey (Focus on Therapeutic Outcomes) the patient's perceived disability score is 79 - zero is the most disabled and 100 is the least disabled. This is a 11 improvement since initial assessment performed on 8/3/17    Pain Level (0-10 scale) post treatment: 0-1/10     ASSESSMENT/Changes in Function:     Progress towards goals / Updated goals:  Short Term Goals: To be accomplished in 5 treatments:  -Independent in HEP as evidenced on ability to perform at least 5 exercises from HEP using proper form without verbal cuing. -MET  -Pain less than or equal to 6-7/10 at worst to allow patient to perform ADL's with greater ease  -MET  -Pt will report compliance with icing 1-2x/day in order to decrease inflammation -MET  -Pain with sleeping decreased at least 25% to allow pt to get more restful sleep -MET     Long Term Goals: To be accomplished in 10 treatments:  -MMT (R) hip IR 5/5 pain-free to allow pt to return to kickboxing -MET for MMT, pt does not wish to go back to kickboxing at this time.   -Full curl up x 10 without pain to allow pt to return to weight lifting -MET  -FOTO score greater than or equal to 77 to allow patient to perform a greater amount of ADLs. -MET         PLAN  []  Upgrade activities as tolerated     [x]  Continue plan of care  []  Update interventions per flow sheet       []  Discharge due to:_  [x]  Other:_  Pt has met all goals outlined in the initial evaluation. Pt to transition to HEP. If pt does not call by 10/1/17, okay to D/YU. Darwin Inches. Jaylin, DEBORAH 9/1/2017  4:00 PM

## 2018-01-16 RX ORDER — LEVOTHYROXINE SODIUM 50 UG/1
TABLET ORAL
Qty: 90 TAB | Refills: 2 | Status: SHIPPED | OUTPATIENT
Start: 2018-01-16 | End: 2018-05-21 | Stop reason: SDUPTHER

## 2018-01-18 ENCOUNTER — OFFICE VISIT (OUTPATIENT)
Dept: INTERNAL MEDICINE CLINIC | Age: 43
End: 2018-01-18

## 2018-01-18 VITALS
HEART RATE: 75 BPM | BODY MASS INDEX: 37.69 KG/M2 | TEMPERATURE: 98.2 F | RESPIRATION RATE: 16 BRPM | HEIGHT: 65 IN | DIASTOLIC BLOOD PRESSURE: 84 MMHG | SYSTOLIC BLOOD PRESSURE: 116 MMHG | OXYGEN SATURATION: 100 % | WEIGHT: 226.2 LBS

## 2018-01-18 DIAGNOSIS — J02.9 SORE THROAT: ICD-10-CM

## 2018-01-18 DIAGNOSIS — E66.9 OBESITY WITH BODY MASS INDEX OF 30.0-39.9: ICD-10-CM

## 2018-01-18 DIAGNOSIS — Z00.00 ROUTINE GENERAL MEDICAL EXAMINATION AT A HEALTH CARE FACILITY: ICD-10-CM

## 2018-01-18 DIAGNOSIS — E89.0 HYPOTHYROIDISM FOLLOWING RADIOIODINE THERAPY: Primary | ICD-10-CM

## 2018-01-18 NOTE — PATIENT INSTRUCTIONS
It was a pleasure to see you! As discussed:    I have ordered your age appropriate labs please complete them. You will need to fast 10-12hrs before your lab appointment. Complete labs two weeks before your next appointment. Sore throat due to Post nasal drip  -Use nasal saline spray 3-4 times/day   -Start non sedating antihistamine such as Claritin, Allegra or Zyrtec (generic is fine) in the day; Xyzal ordered   -Add Benadryl 25mg every evening 2 hours before bedtime if night time coughing is a problem    Three Goals (Big Goal: at least  3-5 lbs weight loss by next visit)  1. Replace one meal with protein drink  2. optimizing your carbohydrate and refined sugar intake (goal carbs 150-200g/ day, refined sugar <50g/ day)   3. Increase Sleep to 7-9hrs/ night         Sore Throat: Care Instructions  Your Care Instructions    Infection by bacteria or a virus causes most sore throats. Cigarette smoke, dry air, air pollution, allergies, and yelling can also cause a sore throat. Sore throats can be painful and annoying. Fortunately, most sore throats go away on their own. If you have a bacterial infection, your doctor may prescribe antibiotics. Follow-up care is a key part of your treatment and safety. Be sure to make and go to all appointments, and call your doctor if you are having problems. It's also a good idea to know your test results and keep a list of the medicines you take. How can you care for yourself at home? · If your doctor prescribed antibiotics, take them as directed. Do not stop taking them just because you feel better. You need to take the full course of antibiotics. · Gargle with warm salt water once an hour to help reduce swelling and relieve discomfort. Use 1 teaspoon of salt mixed in 1 cup of warm water. · Take an over-the-counter pain medicine, such as acetaminophen (Tylenol), ibuprofen (Advil, Motrin), or naproxen (Aleve). Read and follow all instructions on the label.   · Be careful when taking over-the-counter cold or flu medicines and Tylenol at the same time. Many of these medicines have acetaminophen, which is Tylenol. Read the labels to make sure that you are not taking more than the recommended dose. Too much acetaminophen (Tylenol) can be harmful. · Drink plenty of fluids. Fluids may help soothe an irritated throat. Hot fluids, such as tea or soup, may help decrease throat pain. · Use over-the-counter throat lozenges to soothe pain. Regular cough drops or hard candy may also help. These should not be given to young children because of the risk of choking. · Do not smoke or allow others to smoke around you. If you need help quitting, talk to your doctor about stop-smoking programs and medicines. These can increase your chances of quitting for good. · Use a vaporizer or humidifier to add moisture to your bedroom. Follow the directions for cleaning the machine. When should you call for help? Call your doctor now or seek immediate medical care if:  ? · You have new or worse trouble swallowing. ? · Your sore throat gets much worse on one side. ? Watch closely for changes in your health, and be sure to contact your doctor if you do not get better as expected. Where can you learn more? Go to http://donna-gustavo.info/. Enter [phone] [id number] [phone] in the search box to learn more about \"Sore Throat: Care Instructions. \"  Current as of: May 12, 2017  Content Version: 11.4  © 5316-5307 [employer]. Care instructions adapted under license by [employer] (which disclaims liability or warranty for this information). If you have questions about a medical condition or this instruction, always ask your healthcare professional. [place] [address] any warranty or liability for your use of this information.

## 2018-01-18 NOTE — PROGRESS NOTES
HISTORY OF PRESENT ILLNESS  Nabila Mayers is a 38 y.o. female. HPI    Hypothyroidism  Patient is seen for followup of hypothyroidism. Compliant with levothyroxine   Thyroid ROS: denies fatigue, weight changes, heat/cold intolerance, bowel/skin changes or CVS symptoms. Cardiovascular Review  The patient has obesity Body mass index is 37.64 kg/(m^2). Diet and Lifestyle: does not rigorously follow a diabetic diet, exercises sporadically, nonsmoker   Self Care Inventory  Sleep: 4hrs/ night   Eating Habits: sugary drinks   Support: Strong   Spiritual Life: Strong   Exercise: None but active lifestyle. Desk job. Stress: High- busy with work, school, [employer] and family   Home BP Monitoring: is not measured at home. Pertinent ROS: no TIA's, no chest pain on exertion, no dyspnea on exertion, no swelling of ankles. Review of Systems   Constitutional: Negative for diaphoresis, fever and weight loss. Eyes: Negative for blurred vision and pain. Respiratory: Negative for shortness of breath. Cardiovascular: Negative for chest pain, orthopnea and leg swelling. Neurological: Negative for focal weakness and headaches. Psychiatric/Behavioral: Negative for depression. Patient Active Problem List    Diagnosis Date Noted    Iron deficiency anemia 10/29/2015    Hypothyroidism following radioiodine therapy 04/21/2015    Status post gastric bypass for obesity 01/27/2011    Heartburn 01/27/2011    Headache(784.0) 01/27/2011    Chronic low back pain 01/27/2011       Current Outpatient Prescriptions   Medication Sig Dispense Refill    FERROUS SULFATE (IRON PO) Take  by mouth.       levothyroxine (SYNTHROID) 50 mcg tablet TAKE 3 TABLETS BY MOUTH MOST DAYS, THEN TAKE 2 TABLETS ON MONDAY, WEDNESDAY, AND FRIDAY (AVERAGE DOSE 128.5 MCG/DAY) 90 Tab 2    levothyroxine (SYNTHROID) 50 mcg tablet TAKE THREE TABLETS BY MOUTH MOST DAYS AND TAKE TWO TABLETS ON MONDAY, WEDNESDAY, FRIDAY AVERAGE DOSE .5 MCG/DAY 90 Tab 6    cetirizine (ZYRTEC) 10 mg tablet Take 1 Tab by mouth daily. (Patient taking differently: Take 10 mg by mouth daily as needed.) 30 Tab 0    norgestimate-ethinyl estradiol (SPRINTEC, 28,) 0.25-35 mg-mcg per tablet Take 1 Tab by mouth daily. 1 Each 0    ergocalciferol (ERGOCALCIFEROL) 50,000 unit capsule Take 1 Cap by mouth two (2) times a week on Wednesday and Saturday. 24 Cap 3    ascorbic acid (VITAMIN C) 500 mg tablet Take  by mouth.  MULTIVITAMINS W-IRON (MULTI-VITAMIN WITH IRON PO) Take  by mouth.  predniSONE (DELTASONE) 20 mg tablet Take 1 Tab by mouth three (3) times daily. 21 Tab 0       Allergies   Allergen Reactions    Levothyroxine Rash     Reacts to dye in high dose tablets    Sulfa (Sulfonamide Antibiotics) Hives      Visit Vitals    /84 (BP 1 Location: Right arm, BP Patient Position: Sitting)    Pulse 75    Temp 98.2 °F (36.8 °C) (Oral)    Resp 16    Ht 5' 5\" (1.651 m)    Wt 226 lb 3.2 oz (102.6 kg)    SpO2 100%    BMI 37.64 kg/m2       Physical Exam   Constitutional: She is oriented to person, place, and time. She appears well-developed. No distress. HENT:   Cobblestoning and abundant posterior mucus drainage      Eyes: Conjunctivae are normal.   Neck: Neck supple. Cardiovascular: Normal rate, regular rhythm and normal heart sounds. Pulmonary/Chest: Effort normal and breath sounds normal. No respiratory distress. She has no wheezes. She has no rales. She exhibits no tenderness. Neurological: She is alert and oriented to person, place, and time. Skin: Skin is warm. Psychiatric: She has a normal mood and affect. ASSESSMENT and PLAN  Diagnoses and all orders for this visit:    1. Hypothyroidism following radioiodine therapy- overdue for TFT check   -     THYROID PANEL  -     TSH 3RD GENERATION    2.  Sore throat- Sore throat due to Post nasal drip  -Use nasal saline spray 3-4 times/day   -Start non sedating antihistamine such as Claritin, Allegra or Zyrtec (generic is fine) in the day; Xyzal ordered   -Add Benadryl 25mg every evening 2 hours before bedtime if night time coughing is a problem    3. Routine general medical examination at a health care facility- check prior to CPE   -     LIPID PANEL  -     METABOLIC PANEL, COMPREHENSIVE  -     CBC WITH AUTOMATED DIFF  -     HEMOGLOBIN A1C WITH EAG    4. Obesity with body mass index of 30.0-39.9- h/o gastric bypass. I have reviewed/discussed the above normal BMI with the patient. I have recommended the following interventions: dietary management education, guidance, and counseling . Meryl Gone Three Goals (Big Goal: at least  3-5 lbs weight loss by next visit)  1. Replace one meal with protein drink  2. optimizing your carbohydrate and refined sugar intake (goal carbs 150-200g/ day, refined sugar <50g/ day)   3. Increase Sleep to 7-9hrs/ night    Follow-up Disposition:  Return in about 6 months (around 7/18/2018) for Physical - 30 minute appointment. Medication risks/benefits/costs/interactions/alternatives discussed with patient. Alean Kocher  was given an after visit summary which includes diagnoses, current medications, & vitals. she expressed understanding with the diagnosis and plan.

## 2018-01-18 NOTE — PROGRESS NOTES
Chief Complaint   Patient presents with    Weight Management     1. Have you been to the ER, urgent care clinic since your last visit? Hospitalized since your last visit? No    2. Have you seen or consulted any other health care providers outside of the [address] [place] [address] since your last visit? Include any pap smears or colon screening.  No     complains of scratchy throat

## 2018-01-18 NOTE — MR AVS SNAPSHOT
[address] [facility] [address] [facility] 
[phone] Patient: Sherrod Schilder MRN: [id number] Visit Information Date & Time Provider Department Dept. Phone Encounter #  
 1/18/2018  8:30 AM Salbador Gale MD [address] [id number] Internal Medicine [phone] [id number] Follow-up Instructions Return in about 6 months (around 7/18/2018) for Physical - 30 minute appointment. Upcoming Health Maintenance Date Due Influenza Age 5 to Adult 8/1/2017 PAP AKA CERVICAL CYTOLOGY 3/6/2020 DTaP/Tdap/Td series (2 - Td) 10/9/2022 Allergies as of 1/18/2018  Review Complete On: 1/18/2018 By: Salbador Gale MD  
  
 Severity Noted Reaction Type Reactions Levothyroxine  10/06/2009    Rash Reacts to dye in high dose tablets Sulfa (Sulfonamide Antibiotics)  10/06/2009    Hives Current Immunizations  Reviewed on 7/13/2017 Name Date TDAP Vaccine 10/9/2012 Not reviewed this visit You Were Diagnosed With   
  
 Codes Comments Hypothyroidism following radioiodine therapy    -  Primary ICD-10-CM: E89.0 ICD-9-CM: 244.1 Sore throat     ICD-10-CM: J02.9 ICD-9-CM: 026 Routine general medical examination at a health care facility     ICD-10-CM: Z00.00 ICD-9-CM: V70.0 Obesity with body mass index of 30.0-39.9     ICD-10-CM: E66.9 ICD-9-CM: 278.00 Vitals BP Pulse Temp Resp Height(growth percentile) Weight(growth percentile) 116/84 (BP 1 Location: Right arm, BP Patient Position: Sitting) 75 98.2 °F (36.8 °C) (Oral) 16 5' 5\" (1.651 m) 226 lb 3.2 oz (102.6 kg) LMP SpO2 BMI OB Status Smoking Status 12/27/2017 100% 37.64 kg/m2 Having regular periods Never Smoker Vitals History BMI and BSA Data Body Mass Index Body Surface Area  
 37.64 kg/m 2 2.17 m 2 [phone] Your Updated Medication List  
  
   
This list is accurate as of: 1/18/18  9:10 AM.  Always use your most recent med list.  
  
  
  
  
 cetirizine 10 mg tablet Commonly known as:  ZYRTEC Take 1 Tab by mouth daily. ergocalciferol 50,000 unit capsule Commonly known as:  ERGOCALCIFEROL Take 1 Cap by mouth two (2) times a week on Wednesday and Saturday. IRON PO Take  by mouth. * levothyroxine 50 mcg tablet Commonly known as:  SYNTHROID  
TAKE THREE TABLETS BY MOUTH MOST DAYS AND TAKE TWO TABLETS ON MONDAY, WEDNESDAY, FRIDAY AVERAGE DOSE .5 MCG/DAY  
  
 * levothyroxine 50 mcg tablet Commonly known as:  SYNTHROID  
TAKE 3 TABLETS BY MOUTH MOST DAYS, THEN TAKE 2 TABLETS ON MONDAY, WEDNESDAY, AND FRIDAY (AVERAGE DOSE 128.5 MCG/DAY) MULTI-VITAMIN WITH IRON PO Take  by mouth. norgestimate-ethinyl estradiol 0.25-35 mg-mcg Tab Commonly known as:  [facility] (28) Take 1 Tab by mouth daily. VITAMIN C 500 mg tablet Generic drug:  ascorbic acid (vitamin C) Take  by mouth. * Notice: This list has 2 medication(s) that are the same as other medications prescribed for you. Read the directions carefully, and ask your doctor or other care provider to review them with you. We Performed the Following CBC WITH AUTOMATED DIFF [84503 CPT(R)] HEMOGLOBIN A1C WITH EAG [23222 CPT(R)] LIPID PANEL [72495 CPT(R)] METABOLIC PANEL, COMPREHENSIVE [06006 CPT(R)] THYROID PANEL [id number] CPT(R)] TSH 3RD GENERATION [10290 CPT(R)] Follow-up Instructions Return in about 6 months (around 7/18/2018) for Physical - 30 minute appointment. Patient Instructions It was a pleasure to see you! As discussed: 
 
I have ordered your age appropriate labs please complete them. You will need to fast 10-12hrs before your lab appointment. Complete labs two weeks before your next appointment. Sore throat due to Post nasal drip 
-Use nasal saline spray 3-4 times/day  
-Start non sedating antihistamine such as Claritin, Allegra or Zyrtec (generic is fine) in the day; Xyzal ordered  
-Add Benadryl 25mg every evening 2 hours before bedtime if night time coughing is a problem Three Goals (Big Goal: at least  3-5 lbs weight loss by next visit) 1. Replace one meal with protein drink 2. optimizing your carbohydrate and refined sugar intake (goal carbs 150-200g/ day, refined sugar <50g/ day) 3. Increase Sleep to 7-9hrs/ night Sore Throat: Care Instructions Your Care Instructions Infection by bacteria or a virus causes most sore throats. Cigarette smoke, dry air, air pollution, allergies, and yelling can also cause a sore throat. Sore throats can be painful and annoying. Fortunately, most sore throats go away on their own. If you have a bacterial infection, your doctor may prescribe antibiotics. Follow-up care is a key part of your treatment and safety. Be sure to make and go to all appointments, and call your doctor if you are having problems. It's also a good idea to know your test results and keep a list of the medicines you take. How can you care for yourself at home? · If your doctor prescribed antibiotics, take them as directed. Do not stop taking them just because you feel better. You need to take the full course of antibiotics. · Gargle with warm salt water once an hour to help reduce swelling and relieve discomfort. Use 1 teaspoon of salt mixed in 1 cup of warm water. · Take an over-the-counter pain medicine, such as acetaminophen (Tylenol), ibuprofen (Advil, Motrin), or naproxen (Aleve). Read and follow all instructions on the label. · Be careful when taking over-the-counter cold or flu medicines and Tylenol at the same time.  Many of these medicines have acetaminophen, which is Tylenol. Read the labels to make sure that you are not taking more than the recommended dose. Too much acetaminophen (Tylenol) can be harmful. · Drink plenty of fluids. Fluids may help soothe an irritated throat. Hot fluids, such as tea or soup, may help decrease throat pain. · Use over-the-counter throat lozenges to soothe pain. Regular cough drops or hard candy may also help. These should not be given to young children because of the risk of choking. · Do not smoke or allow others to smoke around you. If you need help quitting, talk to your doctor about stop-smoking programs and medicines. These can increase your chances of quitting for good. · Use a vaporizer or humidifier to add moisture to your bedroom. Follow the directions for cleaning the machine. When should you call for help? Call your doctor now or seek immediate medical care if: 
? · You have new or worse trouble swallowing. ? · Your sore throat gets much worse on one side. ? Watch closely for changes in your health, and be sure to contact your doctor if you do not get better as expected. Where can you learn more? Go to http://donna-gustavo.info/. Enter [phone] in the search box to learn more about \"Sore Throat: Care Instructions. \" Current as of: May 12, 2017 Content Version: 11.4 © 5357-0689 [employer]. Care instructions adapted under license by [employer] (which disclaims liability or warranty for this information). If you have questions about a medical condition or this instruction, always ask your healthcare professional. [place] [address] any warranty or liability for your use of this information. Introducing hospitals & HEALTH SERVICES! Dear Yaneth Enter: Thank you for requesting a Traansmission account. Our records indicate that you already have an active Traansmission account. You can access your account anytime at [email or web address] Did you know that you can access your hospital and ER discharge instructions at any time in Zaplee? You can also review all of your test results from your hospital stay or ER visit. Additional Information If you have questions, please visit the Frequently Asked Questions section of the Zaplee website at [email or web address] Remember, Zaplee is NOT to be used for urgent needs. For medical emergencies, dial 911. Now available from your iPhone and Android! Please provide this summary of care documentation to your next provider. Your primary care clinician is listed as Roberto Plasencia. If you have any questions after today's visit, please call [phone].

## 2018-05-14 ENCOUNTER — OFFICE VISIT (OUTPATIENT)
Dept: ENDOCRINOLOGY | Age: 43
End: 2018-05-14

## 2018-05-14 VITALS
SYSTOLIC BLOOD PRESSURE: 122 MMHG | BODY MASS INDEX: 37.99 KG/M2 | WEIGHT: 228 LBS | HEART RATE: 75 BPM | DIASTOLIC BLOOD PRESSURE: 79 MMHG | HEIGHT: 65 IN

## 2018-05-14 DIAGNOSIS — E55.9 VITAMIN D DEFICIENCY: ICD-10-CM

## 2018-05-14 DIAGNOSIS — Z98.84 STATUS POST GASTRIC BYPASS FOR OBESITY: ICD-10-CM

## 2018-05-14 DIAGNOSIS — E89.0 HYPOTHYROIDISM FOLLOWING RADIOIODINE THERAPY: Primary | ICD-10-CM

## 2018-05-14 DIAGNOSIS — E61.1 IRON DEFICIENCY: ICD-10-CM

## 2018-05-14 RX ORDER — FEXOFENADINE HCL AND PSEUDOEPHEDRINE HCI 60; 120 MG/1; MG/1
1 TABLET, EXTENDED RELEASE ORAL EVERY 12 HOURS
COMMUNITY
End: 2018-06-06

## 2018-05-14 NOTE — PROGRESS NOTES
History of Present Illness: Al Herrera is a 38 y.o. female presents for follow-up of hypothyroidism. Hypothyroidism is secondary to SHARIF therapy for hyperthyroidism. She also had gastric bypass in 7/2009 - with resultant vitamin deficiencies - D and iron.     Hypothyroidism - dye allergy and is taking 50 mcg tablet for this reason. Taking equivalent of 128 mcg/day by taking 3 tablets most days and 2 tablets on M, W, F. TSH last drawn in 4/2017 and was normal. Stable since 2015. Energy is good. Taking OCPs - menses normal.      Iron deficiency anemia - after gastric bypass - started iron infusion in fall 2014. Hair, cold intolerance and energy are improved. Infusions then be came cost prohibitive. Now using iron in liquid form. 18 mg is the dose. Taking this daily. Iron level was 17 last year. Energy is good. Hair is doing well. No cold intolerance. Vitamin D - taking 50,000 unit twice weekly- stable dose. Needs to be reassessed.      Taking B12 supplement -liquid form and MVI. Past Medical History:   Diagnosis Date    Chronic constipation 1/27/2011    Chronic joint pain 1/27/2011    Chronic low back pain 1/27/2011    Headache(784.0) 1/27/2011    Heartburn 1/27/2011    Hypothyroid     Morbid obesity ([facility] [address] 1/27/2011    Pap smear for cervical cancer screening 9/12    Status post gastric bypass for obesity 1/27/2011 7/2009     Current Outpatient Prescriptions   Medication Sig    fexofenadine-pseudoephedrine (ALLEGRA-D 12 HOUR)  mg Tb12 Take 1 Tab by mouth every twelve (12) hours.  FERROUS SULFATE (IRON PO) Take  by mouth.     levothyroxine (SYNTHROID) 50 mcg tablet TAKE 3 TABLETS BY MOUTH MOST DAYS, THEN TAKE 2 TABLETS ON MONDAY, WEDNESDAY, AND FRIDAY (AVERAGE DOSE 128.5 MCG/DAY)    levothyroxine (SYNTHROID) 50 mcg tablet TAKE THREE TABLETS BY MOUTH MOST DAYS AND TAKE TWO TABLETS ON MONDAY, WEDNESDAY, FRIDAY AVERAGE DOSE .5 MCG/DAY    cetirizine (ZYRTEC) 10 mg tablet Take 1 Tab by mouth daily. (Patient taking differently: Take 10 mg by mouth daily as needed.)    norgestimate-ethinyl estradiol ([facility], ,) 0.25-35 mg-mcg per tablet Take 1 Tab by mouth daily.  ergocalciferol (ERGOCALCIFEROL) 50,000 unit capsule Take 1 Cap by mouth two (2) times a week on Wednesday and Saturday.  ascorbic acid (VITAMIN C) 500 mg tablet Take  by mouth.  MULTIVITAMINS W-IRON (MULTI-VITAMIN WITH IRON PO) Take  by mouth. No current facility-administered medications for this visit.       Allergies   Allergen Reactions    Levothyroxine Rash     Reacts to dye in high dose tablets    Sulfa (Sulfonamide Antibiotics) Hives         Physical Examination:  Visit Vitals    /79    Pulse 75    Ht 5' 5\" (1.651 m)    Wt 228 lb (103.4 kg)    BMI 37.94 kg/m2   -   - General: pleasant, no distress, normal gait   HEENT: hearing intact, EOMI, clear sclera without icterus  - Neck: no thyromegaly or LAD  - Cardiovascular: regular, normal rate   - Respiratory: normal effort  - Integumentary: no edema  - Psychiatric: normal mood and affect    Data Reviewed:   Component      Latest Ref Rng & Units 4/10/2017 4/10/2017 4/10/2017 4/10/2017           8:40 AM  8:40 AM  8:40 AM  8:40 AM   Cholesterol, total      100 - 199 mg/dL       Triglyceride      0 - 149 mg/dL       HDL Cholesterol      >39 mg/dL       VLDL, calculated      5 - 40 mg/dL       LDL, calculated      0 - 99 mg/dL       Ferritin      15 - 150 ng/mL   17    Vitamin B12      211 - 946 pg/mL  1175 (H)     TSH      0.450 - 4.500 uIU/mL 1.710        Component      Latest Ref Rng & Units 4/10/2017           8:40 AM   Cholesterol, total      100 - 199 mg/dL 133   Triglyceride      0 - 149 mg/dL 54   HDL Cholesterol      >39 mg/dL 69   VLDL, calculated      5 - 40 mg/dL 11   LDL, calculated      0 - 99 mg/dL 53   Ferritin      15 - 150 ng/mL    Vitamin B12      211 - 946 pg/mL    TSH      0.450 - 4.500 uIU/mL      Component      Latest Ref Rng & Units 10/12/2016          12:00 AM   VITAMIN D, 25-HYDROXY      30.0 - 100.0 ng/mL 32.3   Assessment/Plan:   1. Hypothyroidism following radioiodine therapy   - reassess on equivalent of 128 mcg daily  Clinically euthyroid   2. Status post gastric bypass for obesity   - following for iron deficiencies. Vit D, iron, B12.    3. Iron deficiency   - due to gastric bypass  - reassess iron on 18 mg daily. 4. Vitamin D deficiency   - reassess. Continue twice weekly 50,000 units     Patient Instructions   Hypothyroidism, low iron, vit D def, B12 level low   Reassess labs. Follow-up Disposition:  Return in about 1 year (around 5/14/2019).     Copy sent to:

## 2018-05-14 NOTE — MR AVS SNAPSHOT
[address] [facility] [address] 
[phone] Patient: Karyn Boyle MRN: [id number] Visit Information Date & Time Provider Department Dept. Phone Encounter #  
 5/14/2018  8:50 AM Varghese Pruett, [address] [place] [address] Diabetes and Endocrinology [phone] Follow-up Instructions Return in about 1 year (around 5/14/2019). Your Appointments 6/6/2018  9:00 AM  
PHYSICAL with Hasmukh Rosales MD  
[facility] Appt Note: CPE 4/13/17)  
 [address] [facility] [address] [facility] [address]  
[address] [facility] Upcoming Health Maintenance Date Due Influenza Age 5 to Adult 8/1/2018 PAP AKA CERVICAL CYTOLOGY 3/6/2020 DTaP/Tdap/Td series (2 - Td) 10/9/2022 Allergies as of 5/14/2018  Review Complete On: 5/14/2018 By: Varghese Pruett MD  
  
 Severity Noted Reaction Type Reactions Levothyroxine  10/06/2009    Rash Reacts to dye in high dose tablets Sulfa (Sulfonamide Antibiotics)  10/06/2009    Hives Current Immunizations  Reviewed on 7/13/2017 Name Date TDAP Vaccine 10/9/2012 Not reviewed this visit You Were Diagnosed With   
  
 Codes Comments Hypothyroidism following radioiodine therapy    -  Primary ICD-10-CM: E89.0 ICD-9-CM: 244.1 Status post gastric bypass for obesity     ICD-10-CM: Z98.84 ICD-9-CM: V45.86 Iron deficiency     ICD-10-CM: E61.1 ICD-9-CM: 280.9 Vitamin D deficiency     ICD-10-CM: E55.9 ICD-9-CM: 268.9 Vitals BP Pulse Height(growth percentile) Weight(growth percentile) BMI OB Status 122/79 75 5' 5\" (1.651 m) 228 lb (103.4 kg) 37.94 kg/m2 Having regular periods Smoking Status Never Smoker Vitals History BMI and BSA Data Body Mass Index Body Surface Area 37.94 kg/m 2 2.18 m 2 Preferred Pharmacy Pharmacy Name Phone [address] [place] [address] [phone] Your Updated Medication List  
  
   
This list is accurate as of 5/14/18  9:39 AM.  Always use your most recent med list. ALLEGRA-D 12 HOUR  mg Tb12 Generic drug:  fexofenadine-pseudoephedrine Take 1 Tab by mouth every twelve (12) hours. cetirizine 10 mg tablet Commonly known as:  ZYRTEC Take 1 Tab by mouth daily. ergocalciferol 50,000 unit capsule Commonly known as:  ERGOCALCIFEROL Take 1 Cap by mouth two (2) times a week on Wednesday and Saturday. IRON PO Take  by mouth.  
  
 levothyroxine 50 mcg tablet Commonly known as:  SYNTHROID  
TAKE 3 TABLETS BY MOUTH MOST DAYS, THEN TAKE 2 TABLETS ON MONDAY, WEDNESDAY, AND FRIDAY (AVERAGE DOSE 128.5 MCG/DAY) MULTI-VITAMIN WITH IRON PO Take  by mouth. norgestimate-ethinyl estradiol 0.25-35 mg-mcg Tab Commonly known as:  [address] [facility] (28) Take 1 Tab by mouth daily. VITAMIN C 500 mg tablet Generic drug:  ascorbic acid (vitamin C) Take  by mouth. We Performed the Following FERRITIN [11807 CPT(R)] VITAMIN D, 25 HYDROXY [id number] CPT(R)] Follow-up Instructions Return in about 1 year (around 5/14/2019). Patient Instructions Hypothyroidism, low iron, vit D def, B12 level low Reassess labs. Introducing [facility]! Dear Mayur Browning: Thank you for requesting a Stion account. Our records indicate that you already have an active Stion account. You can access your account anytime at [email or web address] Did you know that you can access your hospital and ER discharge instructions at any time in Stion? You can also review all of your test results from your hospital stay or ER visit. Additional Information If you have questions, please visit the Frequently Asked Questions section of the [employer] website at [email or web address] Remember, [facility] is NOT to be used for urgent needs. For medical emergencies, dial 911. Now available from your iPhone and Android! Please provide this summary of care documentation to your next provider. Your primary care clinician is listed as Peter Delgado. If you have any questions after today's visit, please call [phone].

## 2018-06-06 ENCOUNTER — OFFICE VISIT (OUTPATIENT)
Dept: INTERNAL MEDICINE CLINIC | Age: 43
End: 2018-06-06

## 2018-06-06 VITALS
OXYGEN SATURATION: 99 % | BODY MASS INDEX: 38.35 KG/M2 | HEART RATE: 65 BPM | WEIGHT: 230.2 LBS | HEIGHT: 65 IN | SYSTOLIC BLOOD PRESSURE: 112 MMHG | DIASTOLIC BLOOD PRESSURE: 88 MMHG | TEMPERATURE: 97.7 F | RESPIRATION RATE: 16 BRPM

## 2018-06-06 DIAGNOSIS — Z00.00 WELL WOMAN EXAM (NO GYNECOLOGICAL EXAM): Primary | ICD-10-CM

## 2018-06-06 DIAGNOSIS — E66.01 SEVERE OBESITY (BMI 35.0-39.9): ICD-10-CM

## 2018-06-06 DIAGNOSIS — E89.0 HYPOTHYROIDISM FOLLOWING RADIOIODINE THERAPY: ICD-10-CM

## 2018-06-06 DIAGNOSIS — Z12.31 SCREENING MAMMOGRAM, ENCOUNTER FOR: ICD-10-CM

## 2018-06-06 DIAGNOSIS — D50.9 IRON DEFICIENCY ANEMIA, UNSPECIFIED IRON DEFICIENCY ANEMIA TYPE: ICD-10-CM

## 2018-06-06 DIAGNOSIS — Z98.84 STATUS POST GASTRIC BYPASS FOR OBESITY: ICD-10-CM

## 2018-06-06 RX ORDER — TRIAMCINOLONE ACETONIDE 1 MG/G
CREAM TOPICAL
COMMUNITY
Start: 2018-05-11

## 2018-06-06 NOTE — PATIENT INSTRUCTIONS
It was a pleasure to see you! As discussed:    Congratulations on your upcoming graduation in December! You're almost there! Health Maintenance   I have ordered your age appropriate labs please complete them. You will need to fast 10-12hrs before your appointment. Goal for exercise is 150minutes of moderate exercise a week and diet goal is to eat 50% fruits and vegetables with minimal sugar, fat and oil daily. See health.gov or choosemyplate.gov for more details.   -Decrease carbohydrates to 150-200g/ day. Breast cancer screening has been ordered   Your cervical cancer  will be completed by your ob/ gyn as scheduled. Well Visit, Ages 25 to 48: Care Instructions  Your Care Instructions    Physical exams can help you stay healthy. Your doctor has checked your overall health and may have suggested ways to take good care of yourself. He or she also may have recommended tests. At home, you can help prevent illness with healthy eating, regular exercise, and other steps. Follow-up care is a key part of your treatment and safety. Be sure to make and go to all appointments, and call your doctor if you are having problems. It's also a good idea to know your test results and keep a list of the medicines you take. How can you care for yourself at home? · Reach and stay at a healthy weight. This will lower your risk for many problems, such as obesity, diabetes, heart disease, and high blood pressure. · Get at least 30 minutes of physical activity on most days of the week. Walking is a good choice. You also may want to do other activities, such as running, swimming, cycling, or playing tennis or team sports. Discuss any changes in your exercise program with your doctor. · Do not smoke or allow others to smoke around you. If you need help quitting, talk to your doctor about stop-smoking programs and medicines. These can increase your chances of quitting for good.   · Talk to your doctor about whether you have any risk factors for sexually transmitted infections (STIs). Having one sex partner (who does not have STIs and does not have sex with anyone else) is a good way to avoid these infections. · Use birth control if you do not want to have children at this time. Talk with your doctor about the choices available and what might be best for you. · Protect your skin from too much sun. When you're outdoors from 10 a.m. to 4 p.m., stay in the shade or cover up with clothing and a hat with a wide brim. Wear sunglasses that block UV rays. Even when it's cloudy, put broad-spectrum sunscreen (SPF 30 or higher) on any exposed skin. · See a dentist one or two times a year for checkups and to have your teeth cleaned. · Wear a seat belt in the car. · Drink alcohol in moderation, if at all. That means no more than 2 drinks a day for men and 1 drink a day for women. Follow your doctor's advice about when to have certain tests. These tests can spot problems early. For everyone  · Cholesterol. Have the fat (cholesterol) in your blood tested after age 21. Your doctor will tell you how often to have this done based on your age, family history, or other things that can increase your risk for heart disease. · Blood pressure. Have your blood pressure checked during a routine doctor visit. Your doctor will tell you how often to check your blood pressure based on your age, your blood pressure results, and other factors. · Vision. Talk with your doctor about how often to have a glaucoma test.  · Diabetes. Ask your doctor whether you should have tests for diabetes. · Colon cancer. Have a test for colon cancer at age 48. You may have one of several tests. If you are younger than 48, you may need a test earlier if you have any risk factors. Risk factors include whether you already had a precancerous polyp removed from your colon or whether your parent, brother, sister, or child has had colon cancer.   For women  · Breast exam and mammogram. Talk to your doctor about when you should have a clinical breast exam and a mammogram. Medical experts differ on whether and how often women under 50 should have these tests. Your doctor can help you decide what is right for you. · Pap test and pelvic exam. Begin Pap tests at age 24. A Pap test is the best way to find cervical cancer. The test often is part of a pelvic exam. Ask how often to have this test.  · Tests for sexually transmitted infections (STIs). Ask whether you should have tests for STIs. You may be at risk if you have sex with more than one person, especially if your partners do not wear condoms. For men  · Tests for sexually transmitted infections (STIs). Ask whether you should have tests for STIs. You may be at risk if you have sex with more than one person, especially if you do not wear a condom. · Testicular cancer exam. Ask your doctor whether you should check your testicles regularly. · Prostate exam. Talk to your doctor about whether you should have a blood test (called a PSA test) for prostate cancer. Experts differ on whether and when men should have this test. Some experts suggest it if you are older than 39 and are -American or have a father or brother who got prostate cancer when he was younger than 72. When should you call for help? Watch closely for changes in your health, and be sure to contact your doctor if you have any problems or symptoms that concern you. Where can you learn more? Go to http://donna-gustavo.info/. Enter P072 in the search box to learn more about \"Well Visit, Ages 25 to 48: Care Instructions. \"  Current as of: May 12, 2017  Content Version: 11.4  © 0508-5213 Healthwise, Incorporated. Care instructions adapted under license by [employer] (which disclaims liability or warranty for this information).  If you have questions about a medical condition or this instruction, always ask your healthcare professional. [employer] disclaims any warranty or liability for your use of this information.

## 2018-06-06 NOTE — PROGRESS NOTES
HISTORY OF PRESENT ILLNESS  Bony Harris is a 38 y.o. female. HPI   Health Maintenance   Topic Date Due    Influenza Age 5 to Adult  08/01/2018    PAP AKA CERVICAL CYTOLOGY  03/06/2020    DTaP/Tdap/Td series (2 - Td) 10/09/2022       Hypothyroidism  Patient is seen for followup of hypothyroidism. Compliant with levothyroxine   Thyroid ROS: denies fatigue, weight changes, heat/cold intolerance, bowel/skin changes or CVS symptoms.     Cardiovascular Review  The patient has obesity Body mass index is Body mass index is 38.31 kg/(m^2). Diet and Lifestyle: does not rigorously follow a diabetic diet, exercises sporadically, nonsmoker   Self Care Inventory  Sleep: 4hrs/ night   Eating Habits: Not eating regularly. 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 Pertinent ROS: no TIA's, no chest pain on exertion, no dyspnea on exertion, no swelling of ankles. Review of Systems   Constitutional: Negative for diaphoresis, fever and weight loss. Eyes: Negative for blurred vision and pain. Respiratory: Negative for shortness of breath. Cardiovascular: Negative for chest pain, orthopnea and leg swelling. Neurological: Negative for focal weakness and headaches. Psychiatric/Behavioral: Negative for depression.    ROS  Patient Active Problem List    Diagnosis Date Noted    Severe obesity (BMI 35.0-39.9) ([facility] [address] 06/06/2018    Iron deficiency anemia 10/29/2015    Hypothyroidism following radioiodine therapy 04/21/2015    Status post gastric bypass for obesity 01/27/2011    Heartburn 01/27/2011    Headache(784.0) 01/27/2011    Chronic low back pain 01/27/2011       Current Outpatient Prescriptions   Medication Sig Dispense Refill    triamcinolone acetonide (KENALOG) 0.1 % topical cream       levothyroxine (SYNTHROID) 50 mcg tablet TAKE 2 TABLETS BY MOUTH ON MONDAY,WEDNESDAY AND FRIDAY. TAKE 3 TABLETS ON ALL OTHER DAYS 90 Tab 1    FERROUS SULFATE (IRON PO) Take  by mouth.  cetirizine (ZYRTEC) 10 mg tablet Take 1 Tab by mouth daily. (Patient taking differently: Take 10 mg by mouth daily as needed.) 30 Tab 0    norgestimate-ethinyl estradiol (SPRINTEC, 28,) 0.25-35 mg-mcg per tablet Take 1 Tab by mouth daily. 1 Each 0    ergocalciferol (ERGOCALCIFEROL) 50,000 unit capsule Take 1 Cap by mouth two (2) times a week on Wednesday and Saturday. 24 Cap 3    ascorbic acid (VITAMIN C) 500 mg tablet Take  by mouth.  MULTIVITAMINS W-IRON (MULTI-VITAMIN WITH IRON PO) Take  by mouth. Allergies   Allergen Reactions    Levothyroxine Rash     Reacts to dye in high dose tablets    Sulfa (Sulfonamide Antibiotics) Hives      Visit Vitals    /88 (BP 1 Location: Right arm, BP Patient Position: Sitting)    Pulse 65    Temp 97.7 °F (36.5 °C) (Oral)    Resp 16    Ht 5' 5\" (1.651 m)    Wt 230 lb 3.2 oz (104.4 kg)    SpO2 99%    BMI 38.31 kg/m2       Physical Exam   Constitutional: She is oriented to person, place, and time. She appears well-developed and well-nourished. HENT:   Right Ear: External ear normal.   Left Ear: External ear normal.   Mouth/Throat: Oropharynx is clear and moist. No oropharyngeal exudate. Eyes: Conjunctivae are normal. No scleral icterus. Neck: Normal range of motion. Neck supple. No thyromegaly present. Cardiovascular: Normal rate, regular rhythm and normal heart sounds. Exam reveals no gallop and no friction rub. No murmur heard. Pulmonary/Chest: Effort normal and breath sounds normal. No respiratory distress. She has no wheezes. She has no rales. She exhibits no tenderness. Abdominal: Soft. Bowel sounds are normal. She exhibits no distension. There is no tenderness. There is no rebound and no guarding. Genitourinary: Rectal exam shows guaiac negative stool.    Genitourinary Comments: Deferred for gyn per pt request   Musculoskeletal: Normal range of motion. She exhibits no edema or tenderness. Neurological: She is alert and oriented to person, place, and time. Skin:        Psychiatric: She has a normal mood and affect. ASSESSMENT and PLAN  Diagnoses and all orders for this visit:    1. Well woman exam (no gynecological exam)- Health Maintenance reviewed and addressed as ordered below   -     CBC WITH AUTOMATED DIFF  -     IRON PROFILE  -     FERRITIN  -     TSH 3RD GENERATION  -     T4, FREE  -     VITAMIN B12 & FOLATE  -     LIPID PANEL  -     METABOLIC PANEL, COMPREHENSIVE  -     VITAMIN D, 25 HYDROXY  -     [facility] MAMMO BI SCREENING INCL CAD; Future    2. Severe obesity (BMI 35.0-39.9) ([facility] [address]- plans to start weight loss program after she completes school in December. Currently working full time. 3. Status post gastric bypass for obesity- check vitamin levels   -     IRON PROFILE  -     FERRITIN  -     VITAMIN B12 & FOLATE  -     VITAMIN D, 25 HYDROXY    4. Hypothyroidism following radioiodine therapy- per Endocrinology.   -     TSH 3RD GENERATION  -     T4, FREE    5. Iron deficiency anemia, unspecified iron deficiency anemia type- check iron levels   -     IRON PROFILE  -     FERRITIN    6. Screening mammogram, encounter for- due now. -     [facility] MAMMO BI SCREENING INCL CAD; Future      Follow-up Disposition:  Return in about 1 year (around 6/6/2019) for Physical - 30 minute appointment. Medication risks/benefits/costs/interactions/alternatives discussed with patient. Jack Garrido  was given an after visit summary which includes diagnoses, current medications, & vitals. she expressed understanding with the diagnosis and plan.

## 2018-06-06 NOTE — PROGRESS NOTES
Chief Complaint   Patient presents with    Physical     Patient states she is here for her physical.  Patient states she sees GYN for pap.

## 2018-06-06 NOTE — MR AVS SNAPSHOT
[address] [address] 
[phone] Patient: Leitha Gosselin MRN: [id number] Visit Information Date & Time Provider Department Dept. Phone Encounter #  
 6/6/2018  9:00 AM Omkar Perez MD [address] [address] Internal Medicine [phone] [id number] Follow-up Instructions Return in about 1 year (around 6/6/2019) for Physical - 30 minute appointment. Upcoming Health Maintenance Date Due Influenza Age 5 to Adult 8/1/2018 PAP AKA CERVICAL CYTOLOGY 3/6/2020 DTaP/Tdap/Td series (2 - Td) 10/9/2022 Allergies as of 6/6/2018  Review Complete On: 6/6/2018 By: Omkar Perez MD  
  
 Severity Noted Reaction Type Reactions Levothyroxine  10/06/2009    Rash Reacts to dye in high dose tablets Sulfa (Sulfonamide Antibiotics)  10/06/2009    Hives Current Immunizations  Reviewed on 7/13/2017 Name Date TDAP Vaccine 10/9/2012 Not reviewed this visit You Were Diagnosed With   
  
 Codes Comments Well woman exam (no gynecological exam)    -  Primary ICD-10-CM: Z00.00 ICD-9-CM: V70.0 Severe obesity (BMI 35.0-39.9) (HCC)     ICD-10-CM: E66.01 
ICD-9-CM: 278.01 Status post gastric bypass for obesity     ICD-10-CM: Z98.84 ICD-9-CM: V45.86 Hypothyroidism following radioiodine therapy     ICD-10-CM: E89.0 ICD-9-CM: 244.1 Iron deficiency anemia, unspecified iron deficiency anemia type     ICD-10-CM: D50.9 ICD-9-CM: 280.9 Screening mammogram, encounter for     ICD-10-CM: Z12.31 
ICD-9-CM: V76.12 Vitals BP Pulse Temp Resp Height(growth percentile) Weight(growth percentile) 112/88 (BP 1 Location: Right arm, BP Patient Position: Sitting) 65 97.7 °F (36.5 °C) (Oral) 16 5' 5\" (1.651 m) 230 lb 3.2 oz (104.4 kg) LMP SpO2 BMI OB Status Smoking Status 05/23/2018 (Approximate) 99% 38.31 kg/m2 Having regular periods Never Smoker BMI and BSA Data Body Mass Index Body Surface Area  
 38.31 kg/m 2 2.19 m 2 Preferred Pharmacy Pharmacy Name Phone [facility] [address] [place] [address] [phone] Your Updated Medication List  
  
   
This list is accurate as of 6/6/18  9:49 AM.  Always use your most recent med list.  
  
  
  
  
 cetirizine 10 mg tablet Commonly known as:  ZYRTEC Take 1 Tab by mouth daily. ergocalciferol 50,000 unit capsule Commonly known as:  ERGOCALCIFEROL Take 1 Cap by mouth two (2) times a week on Wednesday and Saturday. IRON PO Take  by mouth.  
  
 levothyroxine 50 mcg tablet Commonly known as:  SYNTHROID  
TAKE 2 TABLETS BY MOUTH ON MONDAY,WEDNESDAY AND FRIDAY. TAKE 3 TABLETS ON ALL OTHER DAYS MULTI-VITAMIN WITH IRON PO Take  by mouth. norgestimate-ethinyl estradiol 0.25-35 mg-mcg Tab Commonly known as:  3534 [facility] (28) Take 1 Tab by mouth daily. triamcinolone acetonide 0.1 % topical cream  
Commonly known as:  KENALOG  
  
 VITAMIN C 500 mg tablet Generic drug:  ascorbic acid (vitamin C) Take  by mouth. We Performed the Following CBC WITH AUTOMATED DIFF [96689 CPT(R)] FERRITIN [55121 CPT(R)] IRON PROFILE [id number] CPT(R)] LIPID PANEL [28666 CPT(R)] METABOLIC PANEL, COMPREHENSIVE [68631 CPT(R)] T4, FREE [id number] CPT(R)] TSH 3RD GENERATION [82409 CPT(R)] VITAMIN B12 & FOLATE [78129 CPT(R)] VITAMIN D, 25 HYDROXY [id number] CPT(R)] Follow-up Instructions Return in about 1 year (around 6/6/2019) for Physical - 30 minute appointment. To-Do List   
 06/06/2018 Imaging:  [facility] MAMMO BI SCREENING INCL CAD Patient Instructions It was a pleasure to see you! As discussed: 
 
Congratulations on your upcoming graduation in December! You're almost there! Health Maintenance I have ordered your age appropriate labs please complete them.  You will need to fast 10-12hrs before your appointment. Goal for exercise is 150minutes of moderate exercise a week and diet goal is to eat 50% fruits and vegetables with minimal sugar, fat and oil daily. See health.gov or choosemyplate.gov for more details.  
-Decrease carbohydrates to 150-200g/ day. Breast cancer screening has been ordered Your cervical cancer  will be completed by your ob/ gyn as scheduled. Well Visit, Ages 25 to 48: Care Instructions Your Care Instructions Physical exams can help you stay healthy. Your doctor has checked your overall health and may have suggested ways to take good care of yourself. He or she also may have recommended tests. At home, you can help prevent illness with healthy eating, regular exercise, and other steps. Follow-up care is a key part of your treatment and safety. Be sure to make and go to all appointments, and call your doctor if you are having problems. It's also a good idea to know your test results and keep a list of the medicines you take. How can you care for yourself at home? · Reach and stay at a healthy weight. This will lower your risk for many problems, such as obesity, diabetes, heart disease, and high blood pressure. · Get at least 30 minutes of physical activity on most days of the week. Walking is a good choice. You also may want to do other activities, such as running, swimming, cycling, or playing tennis or team sports. Discuss any changes in your exercise program with your doctor. · Do not smoke or allow others to smoke around you. If you need help quitting, talk to your doctor about stop-smoking programs and medicines. These can increase your chances of quitting for good. · Talk to your doctor about whether you have any risk factors for sexually transmitted infections (STIs). Having one sex partner (who does not have STIs and does not have sex with anyone else) is a good way to avoid these infections. · Use birth control if you do not want to have children at this time. Talk with your doctor about the choices available and what might be best for you. · Protect your skin from too much sun. When you're outdoors from 10 a.m. to 4 p.m., stay in the shade or cover up with clothing and a hat with a wide brim. Wear sunglasses that block UV rays. Even when it's cloudy, put broad-spectrum sunscreen (SPF 30 or higher) on any exposed skin. · See a dentist one or two times a year for checkups and to have your teeth cleaned. · Wear a seat belt in the car. · Drink alcohol in moderation, if at all. That means no more than 2 drinks a day for men and 1 drink a day for women. Follow your doctor's advice about when to have certain tests. These tests can spot problems early. For everyone · Cholesterol. Have the fat (cholesterol) in your blood tested after age 21. Your doctor will tell you how often to have this done based on your age, family history, or other things that can increase your risk for heart disease. · Blood pressure. Have your blood pressure checked during a routine doctor visit. Your doctor will tell you how often to check your blood pressure based on your age, your blood pressure results, and other factors. · Vision. Talk with your doctor about how often to have a glaucoma test. 
· Diabetes. Ask your doctor whether you should have tests for diabetes. · Colon cancer. Have a test for colon cancer at age 48. You may have one of several tests. If you are younger than 48, you may need a test earlier if you have any risk factors. Risk factors include whether you already had a precancerous polyp removed from your colon or whether your parent, brother, sister, or child has had colon cancer. For women · Breast exam and mammogram. Talk to your doctor about when you should have a clinical breast exam and a mammogram. Medical experts differ on whether and how often women under 50 should have these tests.  Your doctor can help you decide what is right for you. · Pap test and pelvic exam. Begin Pap tests at age 24. A Pap test is the best way to find cervical cancer. The test often is part of a pelvic exam. Ask how often to have this test. 
· Tests for sexually transmitted infections (STIs). Ask whether you should have tests for STIs. You may be at risk if you have sex with more than one person, especially if your partners do not wear condoms. For men · Tests for sexually transmitted infections (STIs). Ask whether you should have tests for STIs. You may be at risk if you have sex with more than one person, especially if you do not wear a condom. · Testicular cancer exam. Ask your doctor whether you should check your testicles regularly. · Prostate exam. Talk to your doctor about whether you should have a blood test (called a PSA test) for prostate cancer. Experts differ on whether and when men should have this test. Some experts suggest it if you are older than 39 and are -American or have a father or brother who got prostate cancer when he was younger than 72. When should you call for help? Watch closely for changes in your health, and be sure to contact your doctor if you have any problems or symptoms that concern you. Where can you learn more? Go to http://donna-gustavo.info/. Enter P072 in the search box to learn more about \"Well Visit, Ages 25 to 48: Care Instructions. \" Current as of: May 12, 2017 Content Version: 11.4 © 2713-4985 Healthwise, Incorporated. Care instructions adapted under license by [employer] (which disclaims liability or warranty for this information). If you have questions about a medical condition or this instruction, always ask your healthcare professional. [address] [place] [address] any warranty or liability for your use of this information. Introducing [facility]! Dear Castro Moses: Thank you for requesting a AudioName account. Our records indicate that you already have an active AudioName account. You can access your account anytime at [email or web address] Did you know that you can access your hospital and ER discharge instructions at any time in AudioName? You can also review all of your test results from your hospital stay or ER visit. Additional Information If you have questions, please visit the Frequently Asked Questions section of the AudioName website at [email or web address] Remember, AudioName is NOT to be used for urgent needs. For medical emergencies, dial 911. Now available from your iPhone and Android! Please provide this summary of care documentation to your next provider. Your primary care clinician is listed as Cuab Roa. If you have any questions after today's visit, please call [phone].

## 2018-06-07 LAB
25(OH)D3+25(OH)D2 SERPL-MCNC: 24.3 NG/ML (ref 30–100)
ALBUMIN SERPL-MCNC: 3.9 G/DL (ref 3.5–5.5)
ALBUMIN/GLOB SERPL: 1.8 {RATIO} (ref 1.2–2.2)
ALP SERPL-CCNC: 68 IU/L (ref 39–117)
ALT SERPL-CCNC: 14 IU/L (ref 0–32)
AST SERPL-CCNC: 26 IU/L (ref 0–40)
BASOPHILS # BLD AUTO: 0 X10E3/UL (ref 0–0.2)
BASOPHILS NFR BLD AUTO: 0 %
BILIRUB SERPL-MCNC: 0.6 MG/DL (ref 0–1.2)
BUN SERPL-MCNC: 7 MG/DL (ref 6–24)
BUN/CREAT SERPL: 7 (ref 9–23)
CALCIUM SERPL-MCNC: 8.6 MG/DL (ref 8.7–10.2)
CHLORIDE SERPL-SCNC: 104 MMOL/L (ref 96–106)
CHOLEST SERPL-MCNC: 129 MG/DL (ref 100–199)
CO2 SERPL-SCNC: 26 MMOL/L (ref 18–29)
CREAT SERPL-MCNC: 0.97 MG/DL (ref 0.57–1)
EOSINOPHIL # BLD AUTO: 0 X10E3/UL (ref 0–0.4)
EOSINOPHIL NFR BLD AUTO: 0 %
ERYTHROCYTE [DISTWIDTH] IN BLOOD BY AUTOMATED COUNT: 13.5 % (ref 12.3–15.4)
FERRITIN SERPL-MCNC: 19 NG/ML (ref 15–150)
FOLATE SERPL-MCNC: 16.4 NG/ML
GFR SERPLBLD CREATININE-BSD FMLA CKD-EPI: 73 ML/MIN/1.73
GFR SERPLBLD CREATININE-BSD FMLA CKD-EPI: 84 ML/MIN/1.73
GLOBULIN SER CALC-MCNC: 2.2 G/DL (ref 1.5–4.5)
GLUCOSE SERPL-MCNC: 86 MG/DL (ref 65–99)
HCT VFR BLD AUTO: 37.8 % (ref 34–46.6)
HDLC SERPL-MCNC: 57 MG/DL
HGB BLD-MCNC: 12.4 G/DL (ref 11.1–15.9)
IMM GRANULOCYTES # BLD: 0 X10E3/UL (ref 0–0.1)
IMM GRANULOCYTES NFR BLD: 0 %
IRON SATN MFR SERPL: 49 % (ref 15–55)
IRON SERPL-MCNC: 204 UG/DL (ref 27–159)
LDLC SERPL CALC-MCNC: 57 MG/DL (ref 0–99)
LYMPHOCYTES # BLD AUTO: 1.8 X10E3/UL (ref 0.7–3.1)
LYMPHOCYTES NFR BLD AUTO: 35 %
MCH RBC QN AUTO: 31.2 PG (ref 26.6–33)
MCHC RBC AUTO-ENTMCNC: 32.8 G/DL (ref 31.5–35.7)
MCV RBC AUTO: 95 FL (ref 79–97)
MONOCYTES # BLD AUTO: 0.4 X10E3/UL (ref 0.1–0.9)
MONOCYTES NFR BLD AUTO: 8 %
NEUTROPHILS # BLD AUTO: 2.8 X10E3/UL (ref 1.4–7)
NEUTROPHILS NFR BLD AUTO: 57 %
PLATELET # BLD AUTO: 285 X10E3/UL (ref 150–379)
POTASSIUM SERPL-SCNC: 4.4 MMOL/L (ref 3.5–5.2)
PROT SERPL-MCNC: 6.1 G/DL (ref 6–8.5)
RBC # BLD AUTO: 3.98 X10E6/UL (ref 3.77–5.28)
SODIUM SERPL-SCNC: 142 MMOL/L (ref 134–144)
T4 FREE SERPL-MCNC: 1.28 NG/DL (ref 0.82–1.77)
TIBC SERPL-MCNC: 417 UG/DL (ref 250–450)
TRIGL SERPL-MCNC: 76 MG/DL (ref 0–149)
TSH SERPL DL<=0.005 MIU/L-ACNC: 5.03 UIU/ML (ref 0.45–4.5)
UIBC SERPL-MCNC: 213 UG/DL (ref 131–425)
VIT B12 SERPL-MCNC: 830 PG/ML (ref 232–1245)
VLDLC SERPL CALC-MCNC: 15 MG/DL (ref 5–40)
WBC # BLD AUTO: 5 X10E3/UL (ref 3.4–10.8)

## 2018-06-14 ENCOUNTER — HOSPITAL ENCOUNTER (OUTPATIENT)
Dept: MAMMOGRAPHY | Age: 43
Discharge: HOME | End: 2018-06-14
Attending: INTERNAL MEDICINE
Payer: COMMERCIAL

## 2018-06-14 DIAGNOSIS — Z00.00 WELL WOMAN EXAM (NO GYNECOLOGICAL EXAM): ICD-10-CM

## 2018-06-14 DIAGNOSIS — Z12.31 SCREENING MAMMOGRAM, ENCOUNTER FOR: ICD-10-CM

## 2018-06-14 PROCEDURE — 77067 SCR MAMMO BI INCL CAD: CPT

## 2018-06-20 NOTE — PROGRESS NOTES
Dear Radha Fonseca   It was a pleasure to see you during your recent visit. Thank you for completing your labs. Please find your most recent results below. Your results show:    - Your TSH indicates that your levothyroxine dose may be a little too low. However since Dr. Logan Morales just modified the dose in May there may not have been enough time to reflect the dose. You will need your thyroid level rechecked in 3-4 months. --Your vitamin D is a little low. Start taking an over the counter vitamin D supplement 800-1000 IU/ once a day now. If you are already taking a supplement with vitamin D double the dose (do not exceed more than 5000 international units a day)    -The rest of your vitamin levels and iron are clinically normally    Otherwise no medication changes are needed. Lab Review  Some labs that may have been tested and their explanation are:  Your electrolytes, kidney & liver function (Metabolic Panel)   Anemia, blood cells (CBC)  Thyroid (TSH + T4, T3)  Hormones (prolactin, vitamin D )   Pregnancy (Beta HCG)    Diabetes (Hemoglobin A1c)   PSA (Prostate Health)  Lipid Panel (Cholesterol, HDL \"good\", LDL \"bad\")       Do not hesitate to contact the office if you have any questions or concerns before your next appointment.      Kind regards,   Dr. Senia Scott         ----  Jose Juan Reddy MD  Internal Medicine/ [facility] [address] [place] [address] Internal Medicine   [place] [address], [address] [address]  Office: [phone]  Fax: [phone]

## 2018-06-20 NOTE — PROGRESS NOTES
Dear Dana Bashir   Thank you for completing your mammogram.  Please find your most recent results below. Your results show NO clinical evidence of breast cancer. We will repeat the screening in 1 year. In the interim, continue to perform monthly self breast exams and notify me if you have any suspicious findings. Do not hesitate to contact the office if you have any questions or concerns before your next appointment.      Kind regards,        ----  Shelly Gamble MD  Internal Medicine/ [facility] [address] Internal Medicine   [place] [address], [address] [address]  Office: [phone]

## 2018-08-13 RX ORDER — LEVOTHYROXINE SODIUM 50 UG/1
TABLET ORAL
Qty: 90 TAB | Refills: 1 | OUTPATIENT
Start: 2018-08-13

## 2018-08-22 RX ORDER — LEVOTHYROXINE SODIUM 50 UG/1
TABLET ORAL
Qty: 90 TAB | Refills: 1 | Status: CANCELLED | OUTPATIENT
Start: 2018-08-22

## 2018-08-23 RX ORDER — LEVOTHYROXINE SODIUM 50 UG/1
150 TABLET ORAL DAILY
Qty: 270 TAB | Refills: 2 | Status: SHIPPED | OUTPATIENT
Start: 2018-08-23 | End: 2019-05-31 | Stop reason: SDUPTHER

## 2018-08-23 NOTE — TELEPHONE ENCOUNTER
From: Freddie Brennan  To: Jose G Duque MD  Sent: 8/22/2018 4:29 PM EDT  Subject: Medication Renewal Request    Original authorizing provider: MD Luis Felipe Bowers.  Arnav would like a refill of the following medications:  levothyroxine (SYNTHROID) 50 mcg tablet Jose G Duque MD]    Preferred pharmacy: [address] [place] [address]    Comment:

## 2019-05-31 RX ORDER — LEVOTHYROXINE SODIUM 50 UG/1
150 TABLET ORAL DAILY
Qty: 270 TAB | Refills: 2 | Status: SHIPPED | OUTPATIENT
Start: 2019-05-31

## 2023-07-17 SDOH — HEALTH STABILITY: PHYSICAL HEALTH: ON AVERAGE, HOW MANY MINUTES DO YOU ENGAGE IN EXERCISE AT THIS LEVEL?: 30 MIN

## 2023-07-17 SDOH — HEALTH STABILITY: PHYSICAL HEALTH: ON AVERAGE, HOW MANY DAYS PER WEEK DO YOU ENGAGE IN MODERATE TO STRENUOUS EXERCISE (LIKE A BRISK WALK)?: 2 DAYS

## 2023-07-20 ENCOUNTER — OFFICE VISIT (OUTPATIENT)
Age: 48
End: 2023-07-20
Payer: COMMERCIAL

## 2023-07-20 VITALS
DIASTOLIC BLOOD PRESSURE: 84 MMHG | BODY MASS INDEX: 42.59 KG/M2 | WEIGHT: 265 LBS | HEART RATE: 66 BPM | SYSTOLIC BLOOD PRESSURE: 120 MMHG | TEMPERATURE: 98.1 F | HEIGHT: 66 IN | RESPIRATION RATE: 16 BRPM | OXYGEN SATURATION: 99 %

## 2023-07-20 DIAGNOSIS — Z13.220 SCREENING FOR LIPID DISORDERS: ICD-10-CM

## 2023-07-20 DIAGNOSIS — Z76.89 ENCOUNTER TO ESTABLISH CARE WITH NEW DOCTOR: Primary | ICD-10-CM

## 2023-07-20 DIAGNOSIS — E89.0 HYPOTHYROIDISM FOLLOWING RADIOIODINE THERAPY: ICD-10-CM

## 2023-07-20 DIAGNOSIS — D50.9 IRON DEFICIENCY ANEMIA, UNSPECIFIED IRON DEFICIENCY ANEMIA TYPE: ICD-10-CM

## 2023-07-20 DIAGNOSIS — Z11.59 NEED FOR HEPATITIS C SCREENING TEST: ICD-10-CM

## 2023-07-20 DIAGNOSIS — Z23 ENCOUNTER FOR VACCINATION: ICD-10-CM

## 2023-07-20 DIAGNOSIS — Z13.1 SCREENING FOR DIABETES MELLITUS: ICD-10-CM

## 2023-07-20 LAB
ALBUMIN SERPL-MCNC: 3.6 G/DL (ref 3.5–5)
ALBUMIN/GLOB SERPL: 1.2 (ref 1.1–2.2)
ALP SERPL-CCNC: 82 U/L (ref 45–117)
ALT SERPL-CCNC: 24 U/L (ref 12–78)
ANION GAP SERPL CALC-SCNC: 2 MMOL/L (ref 5–15)
AST SERPL-CCNC: 22 U/L (ref 15–37)
BILIRUB SERPL-MCNC: 0.8 MG/DL (ref 0.2–1)
BUN SERPL-MCNC: 12 MG/DL (ref 6–20)
BUN/CREAT SERPL: 12 (ref 12–20)
CALCIUM SERPL-MCNC: 8.3 MG/DL (ref 8.5–10.1)
CHLORIDE SERPL-SCNC: 110 MMOL/L (ref 97–108)
CHOLEST SERPL-MCNC: 143 MG/DL
CO2 SERPL-SCNC: 29 MMOL/L (ref 21–32)
CREAT SERPL-MCNC: 1.03 MG/DL (ref 0.55–1.02)
GLOBULIN SER CALC-MCNC: 3.1 G/DL (ref 2–4)
GLUCOSE SERPL-MCNC: 91 MG/DL (ref 65–100)
HDLC SERPL-MCNC: 73 MG/DL
HDLC SERPL: 2 (ref 0–5)
LDLC SERPL CALC-MCNC: 57.8 MG/DL (ref 0–100)
POTASSIUM SERPL-SCNC: 4.5 MMOL/L (ref 3.5–5.1)
PROT SERPL-MCNC: 6.7 G/DL (ref 6.4–8.2)
SODIUM SERPL-SCNC: 141 MMOL/L (ref 136–145)
TRIGL SERPL-MCNC: 61 MG/DL
TSH SERPL DL<=0.05 MIU/L-ACNC: 6.02 UIU/ML (ref 0.36–3.74)
VLDLC SERPL CALC-MCNC: 12.2 MG/DL

## 2023-07-20 PROCEDURE — 99204 OFFICE O/P NEW MOD 45 MIN: CPT | Performed by: FAMILY MEDICINE

## 2023-07-20 PROCEDURE — 90715 TDAP VACCINE 7 YRS/> IM: CPT | Performed by: FAMILY MEDICINE

## 2023-07-20 PROCEDURE — 90471 IMMUNIZATION ADMIN: CPT | Performed by: FAMILY MEDICINE

## 2023-07-20 RX ORDER — NORGESTIMATE AND ETHINYL ESTRADIOL 0.25-0.035
1 KIT ORAL DAILY
COMMUNITY
Start: 2023-03-15

## 2023-07-20 RX ORDER — LEVOTHYROXINE SODIUM 0.05 MG/1
50 TABLET ORAL
COMMUNITY

## 2023-07-20 SDOH — ECONOMIC STABILITY: FOOD INSECURITY: WITHIN THE PAST 12 MONTHS, THE FOOD YOU BOUGHT JUST DIDN'T LAST AND YOU DIDN'T HAVE MONEY TO GET MORE.: NEVER TRUE

## 2023-07-20 SDOH — ECONOMIC STABILITY: FOOD INSECURITY: WITHIN THE PAST 12 MONTHS, YOU WORRIED THAT YOUR FOOD WOULD RUN OUT BEFORE YOU GOT MONEY TO BUY MORE.: NEVER TRUE

## 2023-07-20 SDOH — ECONOMIC STABILITY: INCOME INSECURITY: HOW HARD IS IT FOR YOU TO PAY FOR THE VERY BASICS LIKE FOOD, HOUSING, MEDICAL CARE, AND HEATING?: NOT HARD AT ALL

## 2023-07-20 SDOH — ECONOMIC STABILITY: HOUSING INSECURITY
IN THE LAST 12 MONTHS, WAS THERE A TIME WHEN YOU DID NOT HAVE A STEADY PLACE TO SLEEP OR SLEPT IN A SHELTER (INCLUDING NOW)?: NO

## 2023-07-20 ASSESSMENT — PATIENT HEALTH QUESTIONNAIRE - PHQ9
2. FEELING DOWN, DEPRESSED OR HOPELESS: 0
SUM OF ALL RESPONSES TO PHQ QUESTIONS 1-9: 0
SUM OF ALL RESPONSES TO PHQ9 QUESTIONS 1 & 2: 0
1. LITTLE INTEREST OR PLEASURE IN DOING THINGS: 0

## 2023-07-20 ASSESSMENT — ENCOUNTER SYMPTOMS
NAUSEA: 0
ABDOMINAL PAIN: 0
WHEEZING: 0
EYE ITCHING: 0
SORE THROAT: 0
COUGH: 0
CONSTIPATION: 0
SINUS PAIN: 0
EYE DISCHARGE: 0
DIARRHEA: 0
CHEST TIGHTNESS: 0
BLOOD IN STOOL: 0
EYE PAIN: 0
SHORTNESS OF BREATH: 0
VOMITING: 0

## 2023-07-20 NOTE — PROGRESS NOTES
Chief Complaint   Patient presents with    Established New Doctor     1. \"Have you been to the ER, urgent care clinic since your last visit? Hospitalized since your last visit? \" no    2. \"Have you seen or consulted any other health care providers outside of the [address] [place] [address] Avenue since your last visit? \"  Ortho for knee pain   Chiropractor - re-alignment due to a lot of sitting      Hematology for Iron Infusions  Dr. Chaya Belle     3. For patients aged 43-73: Has the patient had a colonoscopy / FIT/ Cologuard?  Yes this year [employer] [facility]

## 2023-07-20 NOTE — PROGRESS NOTES
Patient Name: Cece Lloyd   MRN: [id number]    Flower Tillman is a 41 y.o. female who presents with the following: Here to establish care with new PCP. Patient has a history of hypothyroidism diagnosed around age 27. Underwent radioactive therapy and has since been on thyroid replacement since. Currently on Synthroid 50 mcg every day except for Sundays and Wednesdays. Has a history of hives/allergy symptoms to generic levothyroxine. She underwent a gastric bypass in 2009. Her gallbladder was removed. She is up-to-date with her colonoscopies as her father had colon cancer. Her prior PCP gave her samples of Ozempic which did help her lose weight and she tolerated it well. Is interested in restarting this. Sees hematology for twice yearly iron infusions for iron deficiency anemia. Cause of anemia is unknown. Denies any bleeding. Has an upcoming appointment with her OB/GYN this fall. Review of Systems   Constitutional:  Negative for fatigue, fever and unexpected weight change. HENT:  Negative for congestion, ear pain, hearing loss, sinus pain and sore throat. Eyes:  Negative for pain, discharge, itching and visual disturbance. Respiratory:  Negative for cough, chest tightness, shortness of breath and wheezing. Cardiovascular:  Negative for chest pain, palpitations and leg swelling. Gastrointestinal:  Negative for abdominal pain, blood in stool, constipation, diarrhea, nausea and vomiting. Genitourinary:  Negative for dysuria, flank pain, frequency and urgency. Skin:  Negative for rash. Neurological:  Negative for dizziness, seizures, weakness and headaches. Psychiatric/Behavioral:  Negative for dysphoric mood, sleep disturbance and suicidal ideas. The patient is not nervous/anxious. All other systems reviewed and are negative.       The patient's medications, allergies, past medical history, surgical history, family history and social history were reviewed

## 2023-07-20 NOTE — PATIENT INSTRUCTIONS
To schedule the test that I ordered for you, please call Central Scheduling at [phone].       GLP-1 options:    Semaglutide (Ozempic, Wegovy, Rybelsus)  Liraglutide (Saxenda)  Dulaglutide (Trulicity)  Tirzepatide Preston Peoples

## 2023-07-21 ENCOUNTER — HOSPITAL ENCOUNTER (OUTPATIENT)
Facility: HOSPITAL | Age: 48
Discharge: HOME | End: 2023-07-21
Payer: COMMERCIAL

## 2023-07-21 DIAGNOSIS — E89.0 HYPOTHYROIDISM FOLLOWING RADIOIODINE THERAPY: ICD-10-CM

## 2023-07-21 LAB
EST. AVERAGE GLUCOSE BLD GHB EST-MCNC: 91 MG/DL
HBA1C MFR BLD: 4.8 % (ref 4–5.6)

## 2023-07-21 PROCEDURE — 76536 US EXAM OF HEAD AND NECK: CPT

## 2023-07-22 LAB
HCV AB SERPL QL IA: NORMAL
HCV IGG SERPL QL IA: NON REACTIVE S/CO RATIO

## 2023-07-27 ENCOUNTER — PATIENT MESSAGE (OUTPATIENT)
Age: 48
End: 2023-07-27

## 2023-07-27 RX ORDER — LEVOTHYROXINE SODIUM 0.05 MG/1
50 TABLET ORAL
Qty: 90 TABLET | Refills: 1 | Status: CANCELLED | OUTPATIENT
Start: 2023-07-27

## 2023-07-27 RX ORDER — LEVOTHYROXINE SODIUM 0.05 MG/1
50 TABLET ORAL
Qty: 90 TABLET | Refills: 3 | Status: SHIPPED | OUTPATIENT
Start: 2023-07-27

## 2023-07-27 RX ORDER — CLOBETASOL PROPIONATE 0.5 MG/G
CREAM TOPICAL 2 TIMES DAILY PRN
Qty: 45 G | Refills: 0 | Status: SHIPPED | OUTPATIENT
Start: 2023-07-27 | End: 2023-07-28

## 2023-07-27 NOTE — TELEPHONE ENCOUNTER
PCP: Akiko Fong MD    Last appt: 7/20/2023       No future appointments.     Requested Prescriptions      No prescriptions requested or ordered in this encounter       Prior labs and Blood pressures:  BP Readings from Last 3 Encounters:   07/20/23 120/84     Lab Results   Component Value Date/Time     07/20/2023 09:06 AM    K 4.5 07/20/2023 09:06 AM     07/20/2023 09:06 AM    CO2 29 07/20/2023 09:06 AM    BUN 12 07/20/2023 09:06 AM     No results found for: HBA1C, [id number]  Lab Results   Component Value Date/Time    CHOL 143 07/20/2023 09:06 AM    HDL 73 07/20/2023 09:06 AM     No results found for: VITD3, VD3RIA    No results found for: TSH, TSH2, TSH3

## 2023-07-31 ENCOUNTER — TELEPHONE (OUTPATIENT)
Age: 48
End: 2023-07-31

## 2023-07-31 NOTE — TELEPHONE ENCOUNTER
Chief Complaint   Patient presents with    Prior Authorization     Ozempic (0.25 or 0.5 MG/DOSE) 2MG/3ML pen-injectors; PA has been denied. PA Case: [id number], Status: Denied. Notification: Completed.

## 2023-09-22 ENCOUNTER — TELEMEDICINE (OUTPATIENT)
Age: 48
End: 2023-09-22
Payer: COMMERCIAL

## 2023-09-22 DIAGNOSIS — E89.0 HYPOTHYROIDISM FOLLOWING RADIOIODINE THERAPY: Primary | ICD-10-CM

## 2023-09-22 PROCEDURE — 99213 OFFICE O/P EST LOW 20 MIN: CPT | Performed by: FAMILY MEDICINE

## 2023-09-22 RX ORDER — LEVOTHYROXINE SODIUM 0.05 MG/1
50 TABLET ORAL
Qty: 90 TABLET | Refills: 1 | Status: SHIPPED | OUTPATIENT
Start: 2023-09-22

## 2023-09-22 ASSESSMENT — ENCOUNTER SYMPTOMS
WHEEZING: 0
ABDOMINAL PAIN: 0
NAUSEA: 0
COUGH: 0
CHEST TIGHTNESS: 0
CONSTIPATION: 0
VOMITING: 0
SHORTNESS OF BREATH: 0
DIARRHEA: 0

## 2023-09-22 NOTE — PROGRESS NOTES
levothyroxine (SYNTHROID) 50 MCG tablet     Sig: Take 1 tablet by mouth Five times weekly     Dispense:  90 tablet     Refill:  1     BRAND ONLY        Medications Discontinued During This Encounter   Medication Reason    Semaglutide,0.25 or 0.5MG/DOS, 2 MG/3ML SOPN ERROR    levothyroxine (SYNTHROID) 50 MCG tablet REORDER       No follow-ups on file. Treatment risks/benefits/costs/interactions/alternatives discussed with patient. Advised patient to call back or return to office if symptoms worsen/change/persist. If patient cannot reach us or should anything more severe/urgent arise he/she should proceed directly to the nearest emergency department. Discussed expected course/resolution/complications of diagnosis in detail with patient. Patient expressed understanding with the diagnosis and plan. This dictation may have been completed with Dragon, the [employer] voice recognition software. Unanticipated grammatical, syntax, homophones, and other interpretive errors are sometimes inadvertently transcribed by the computer software. Please disregard any errors that have escaped final proofreading. Janet Palomo M.D.

## 2023-10-02 ENCOUNTER — PATIENT MESSAGE (OUTPATIENT)
Age: 48
End: 2023-10-02

## 2023-10-02 DIAGNOSIS — E89.0 HYPOTHYROIDISM FOLLOWING RADIOIODINE THERAPY: ICD-10-CM

## 2023-10-02 RX ORDER — LEVOTHYROXINE SODIUM 0.15 MG/1
150 TABLET ORAL
Qty: 90 TABLET | Refills: 1 | Status: SHIPPED | OUTPATIENT
Start: 2023-10-02

## 2024-05-09 DIAGNOSIS — E89.0 HYPOTHYROIDISM FOLLOWING RADIOIODINE THERAPY: ICD-10-CM

## 2024-05-09 RX ORDER — LEVOTHYROXINE SODIUM 150 MCG
150 TABLET ORAL
Qty: 72 TABLET | Refills: 0 | Status: SHIPPED | OUTPATIENT
Start: 2024-05-09 | End: 2024-08-07

## 2024-05-09 NOTE — TELEPHONE ENCOUNTER
PCP: Janet Catherine MD    Last appt: 9/22/2023       No future appointments.    Requested Prescriptions     Pending Prescriptions Disp Refills    SYNTHROID 150 MCG tablet [Pharmacy Med Name: SYNTHROID 0.15MG (150MCG)TABLETS] 90 tablet 1     Sig: TAKE 1 TABLET BY MOUTH 5 TIMES WEEKLY       Prior labs and Blood pressures:  BP Readings from Last 3 Encounters:   07/20/23 120/84     Lab Results   Component Value Date/Time     07/20/2023 09:06 AM    K 4.5 07/20/2023 09:06 AM     07/20/2023 09:06 AM    CO2 29 07/20/2023 09:06 AM    BUN 12 07/20/2023 09:06 AM     No results found for: \"HBA1C\", \"[id number]\"  Lab Results   Component Value Date/Time    CHOL 143 07/20/2023 09:06 AM    HDL 73 07/20/2023 09:06 AM    LDL 57.8 07/20/2023 09:06 AM    VLDL 12.2 07/20/2023 09:06 AM     No results found for: \"VITD3\", \"VD3RIA\"    No results found for: \"TSH\", \"TSH2\", \"TSH3\"     
Pt's scheduled to see  on 6/4/24 @ 1:45 PM.  
Unknown

## 2024-06-04 ENCOUNTER — OFFICE VISIT (OUTPATIENT)
Age: 49
End: 2024-06-04
Payer: COMMERCIAL

## 2024-06-04 VITALS
TEMPERATURE: 97.5 F | RESPIRATION RATE: 16 BRPM | DIASTOLIC BLOOD PRESSURE: 72 MMHG | SYSTOLIC BLOOD PRESSURE: 114 MMHG | OXYGEN SATURATION: 98 % | WEIGHT: 237.4 LBS | HEIGHT: 66 IN | HEART RATE: 79 BPM | BODY MASS INDEX: 38.15 KG/M2

## 2024-06-04 DIAGNOSIS — L30.9 ECZEMA, UNSPECIFIED TYPE: ICD-10-CM

## 2024-06-04 DIAGNOSIS — E89.0 HYPOTHYROIDISM FOLLOWING RADIOIODINE THERAPY: Primary | ICD-10-CM

## 2024-06-04 PROCEDURE — 99214 OFFICE O/P EST MOD 30 MIN: CPT | Performed by: FAMILY MEDICINE

## 2024-06-04 RX ORDER — CLOBETASOL PROPIONATE 0.5 MG/G
CREAM TOPICAL 2 TIMES DAILY
Qty: 30 G | Refills: 0 | Status: SHIPPED | OUTPATIENT
Start: 2024-06-04

## 2024-06-04 ASSESSMENT — ENCOUNTER SYMPTOMS
NAUSEA: 0
DIARRHEA: 0
CONSTIPATION: 0
CHEST TIGHTNESS: 0
SHORTNESS OF BREATH: 0
COUGH: 0
WHEEZING: 0
VOMITING: 0
ABDOMINAL PAIN: 0

## 2024-06-04 ASSESSMENT — PATIENT HEALTH QUESTIONNAIRE - PHQ9
SUM OF ALL RESPONSES TO PHQ QUESTIONS 1-9: 0
1. LITTLE INTEREST OR PLEASURE IN DOING THINGS: NOT AT ALL
SUM OF ALL RESPONSES TO PHQ9 QUESTIONS 1 & 2: 0
SUM OF ALL RESPONSES TO PHQ QUESTIONS 1-9: 0
2. FEELING DOWN, DEPRESSED OR HOPELESS: NOT AT ALL
SUM OF ALL RESPONSES TO PHQ QUESTIONS 1-9: 0
SUM OF ALL RESPONSES TO PHQ QUESTIONS 1-9: 0

## 2024-06-04 NOTE — PROGRESS NOTES
Chief Complaint   Patient presents with    Follow-up     \"Have you been to the ER, urgent care clinic since your last visit?  Hospitalized since your last visit?\"    NO    “Have you seen or consulted any other health care providers outside of [facility] since your last visit?”    Yes. Podiatrist- Dr. Malik.         “Have you had a pap smear?”    NO    No cervical cancer screening on file              Financial Resource Strain: Low Risk  (7/20/2023)    Overall Financial Resource Strain (CARDIA)     Difficulty of Paying Living Expenses: Not hard at all      Food Insecurity: Not on file (7/20/2023)            6/4/2024     1:38 PM   PHQ-9    Little interest or pleasure in doing things 0   Feeling down, depressed, or hopeless 0   PHQ-2 Score 0   PHQ-9 Total Score 0       Health Maintenance Due   Topic Date Due    Hepatitis B vaccine (1 of 3 - 3-dose series) Never done    Cervical cancer screen  Never done    COVID-19 Vaccine (3 - 2023-24 season) 09/01/2023

## 2024-06-04 NOTE — PROGRESS NOTES
Patient Name: Lisa Lind   MRN: [id number]    ASSESSMENT AND PLAN  Lisa Lind is a 48 y.o. female who presents today for:    1. Hypothyroidism following radioiodine therapy  Stable, continue current treatment, pending review of labs.  - TSH + Free T4 Panel; Future  - TSH + Free T4 Panel    2. Eczema, unspecified type  Recommend higher potency cream for foot. Recommend nightly scrubs and using Aquaphor with cotton socks. Consider dermatology evaluation if persistent.  - clobetasol (TEMOVATE) 0.05 % cream; Apply topically in the morning and at bedtime Apply topically 2 times daily.  Dispense: 30 g; Refill: 0      Orders Placed This Encounter   Medications    clobetasol (TEMOVATE) 0.05 % cream     Sig: Apply topically in the morning and at bedtime Apply topically 2 times daily.     Dispense:  30 g     Refill:  0        There are no discontinued medications.      SUBJECTIVE  Lisa Lind is a 48 y.o. female who presents with the following:     Hx of hypothyroidism, on levothyroxine.  Currently taking dose every day except for Sundays.  Believes that she has dyshidrotic eczema on the bottom of her right foot.  Uses Lidex cream and other parts of her body for eczema which helps but her foot does not respond to the cream.       Review of Systems   Constitutional:  Negative for activity change, appetite change, fatigue, fever and unexpected weight change.   Respiratory:  Negative for cough, chest tightness, shortness of breath and wheezing.    Cardiovascular:  Negative for chest pain, palpitations and leg swelling.   Gastrointestinal:  Negative for abdominal pain, constipation, diarrhea, nausea and vomiting.   Genitourinary:  Negative for dysuria, frequency and urgency.   Skin:  Positive for rash.   Neurological:  Negative for dizziness, weakness and headaches.   Psychiatric/Behavioral:  Negative for dysphoric mood and suicidal ideas. The patient is not nervous/anxious.    All other systems reviewed and

## 2024-06-05 LAB
T4 FREE SERPL-MCNC: 1.3 NG/DL (ref 0.8–1.5)
TSH SERPL DL<=0.05 MIU/L-ACNC: 0.67 UIU/ML (ref 0.36–3.74)

## 2024-06-06 DIAGNOSIS — E89.0 HYPOTHYROIDISM FOLLOWING RADIOIODINE THERAPY: ICD-10-CM

## 2024-06-06 RX ORDER — LEVOTHYROXINE SODIUM 150 MCG
150 TABLET ORAL
Qty: 72 TABLET | Refills: 3 | Status: SHIPPED | OUTPATIENT
Start: 2024-06-06 | End: 2024-09-04

## 2024-08-21 DIAGNOSIS — E89.0 HYPOTHYROIDISM FOLLOWING RADIOIODINE THERAPY: ICD-10-CM

## 2024-08-21 RX ORDER — LEVOTHYROXINE SODIUM 150 MCG
150 TABLET ORAL
Qty: 90 TABLET | Refills: 1 | Status: SHIPPED | OUTPATIENT
Start: 2024-08-21 | End: 2024-11-19

## 2024-08-21 NOTE — TELEPHONE ENCOUNTER
PCP: Janet Catherine MD    Last appt: 6/4/2024       Future Appointments   Date Time Provider Department Center   12/12/2024  8:30 AM [facility]       Requested Prescriptions     Pending Prescriptions Disp Refills    SYNTHROID 150 MCG tablet 72 tablet 3     Sig: Take 1 tablet by mouth Six times weekly       Prior labs and Blood pressures:  BP Readings from Last 3 Encounters:   06/04/24 114/72   07/20/23 120/84     Lab Results   Component Value Date/Time     07/20/2023 09:06 AM    K 4.5 07/20/2023 09:06 AM     07/20/2023 09:06 AM    CO2 29 07/20/2023 09:06 AM    BUN 12 07/20/2023 09:06 AM     No results found for: \"HBA1C\", \"[id number]\"  Lab Results   Component Value Date/Time    CHOL 143 07/20/2023 09:06 AM    HDL 73 07/20/2023 09:06 AM    LDL 57.8 07/20/2023 09:06 AM    VLDL 12.2 07/20/2023 09:06 AM     No results found for: \"VITD3\"    Lab Results   Component Value Date/Time    TSH 0.67 06/04/2024 02:07 PM

## 2024-12-09 SDOH — HEALTH STABILITY: PHYSICAL HEALTH: ON AVERAGE, HOW MANY DAYS PER WEEK DO YOU ENGAGE IN MODERATE TO STRENUOUS EXERCISE (LIKE A BRISK WALK)?: 2 DAYS

## 2024-12-09 SDOH — HEALTH STABILITY: PHYSICAL HEALTH: ON AVERAGE, HOW MANY MINUTES DO YOU ENGAGE IN EXERCISE AT THIS LEVEL?: 30 MIN

## 2024-12-12 ENCOUNTER — OFFICE VISIT (OUTPATIENT)
Facility: CLINIC | Age: 49
End: 2024-12-12
Payer: COMMERCIAL

## 2024-12-12 VITALS
DIASTOLIC BLOOD PRESSURE: 90 MMHG | SYSTOLIC BLOOD PRESSURE: 138 MMHG | HEIGHT: 66 IN | WEIGHT: 218.2 LBS | RESPIRATION RATE: 14 BRPM | BODY MASS INDEX: 35.07 KG/M2 | OXYGEN SATURATION: 99 % | HEART RATE: 77 BPM

## 2024-12-12 DIAGNOSIS — E89.0 HYPOTHYROIDISM FOLLOWING RADIOIODINE THERAPY: ICD-10-CM

## 2024-12-12 DIAGNOSIS — E89.0 HYPOTHYROIDISM FOLLOWING RADIOIODINE THERAPY: Primary | ICD-10-CM

## 2024-12-12 DIAGNOSIS — R03.0 ELEVATED BLOOD PRESSURE READING IN OFFICE WITHOUT DIAGNOSIS OF HYPERTENSION: ICD-10-CM

## 2024-12-12 DIAGNOSIS — E66.812 CLASS 2 OBESITY WITHOUT SERIOUS COMORBIDITY WITH BODY MASS INDEX (BMI) OF 35.0 TO 35.9 IN ADULT, UNSPECIFIED OBESITY TYPE: ICD-10-CM

## 2024-12-12 LAB
CHOLEST SERPL-MCNC: 166 MG/DL
HDLC SERPL-MCNC: 88 MG/DL
HDLC SERPL: 1.9 (ref 0–5)
LDLC SERPL CALC-MCNC: 64.8 MG/DL (ref 0–100)
T4 FREE SERPL-MCNC: 1.3 NG/DL (ref 0.8–1.5)
TRIGL SERPL-MCNC: 66 MG/DL
TSH SERPL DL<=0.05 MIU/L-ACNC: 1.61 UIU/ML (ref 0.36–3.74)
VLDLC SERPL CALC-MCNC: 13.2 MG/DL

## 2024-12-12 PROCEDURE — 99204 OFFICE O/P NEW MOD 45 MIN: CPT | Performed by: INTERNAL MEDICINE

## 2024-12-12 RX ORDER — NORGESTIMATE AND ETHINYL ESTRADIOL 0.25-0.035
KIT ORAL
COMMUNITY
Start: 2024-11-24

## 2024-12-12 ASSESSMENT — PATIENT HEALTH QUESTIONNAIRE - PHQ9
2. FEELING DOWN, DEPRESSED OR HOPELESS: NOT AT ALL
SUM OF ALL RESPONSES TO PHQ9 QUESTIONS 1 & 2: 0
SUM OF ALL RESPONSES TO PHQ QUESTIONS 1-9: 0
SUM OF ALL RESPONSES TO PHQ QUESTIONS 1-9: 0
1. LITTLE INTEREST OR PLEASURE IN DOING THINGS: NOT AT ALL
SUM OF ALL RESPONSES TO PHQ QUESTIONS 1-9: 0
SUM OF ALL RESPONSES TO PHQ QUESTIONS 1-9: 0

## 2024-12-12 NOTE — PROGRESS NOTES
been as active.     Hematology has been following her for her iron deficiency, with iron infusions.  She just had labs for iron levels, CBC, metabolic panel in early December.      Patient Active Problem List   Diagnosis    Iron deficiency anemia    Status post gastric bypass for obesity    Hypothyroidism following radioiodine therapy    BMI 40.0-44.9, adult          Past Medical History:   Diagnosis Date    Hypothyroidism 1998    On Medication after Radioactive Iodine    Iron deficiency anemia 10/29/2015    menorrhagia and s/p gastric bypass    Osteoarthritis 04/13/2023    Knee; Saw Dr. Jeremy GRACE Replaced by [facility]          Past Surgical History:   Procedure Laterality Date    EYE SURGERY  10/15/2024    Lasik    FRACTURE SURGERY  1993    left(pinkie) metacarpus    GASTRIC BYPASS SURGERY  2009    UTERINE FIBROID EMBOLIZATION  07/2022          Family History   Problem Relation Age of Onset    Osteoarthritis Mother     Cancer Father         Colon, liver    Colon Cancer Father     Gout Father     Heart Attack Father     Heart Disease Father         CABG x 3, atrial fibrillation    Hypertension Father     Elevated Lipids Father     Kidney Disease Father     Kidney Disease Maternal Grandmother     Prostate Cancer Maternal Grandfather     Substance Abuse Maternal Grandfather     Heart Attack Paternal Grandmother     Heart Disease Paternal Grandmother     Heart Attack Paternal Grandfather     Heart Disease Paternal Grandfather           Social History     Tobacco Use    Smoking status: Never    Smokeless tobacco: Never   Vaping Use    Vaping status: Never Used   Substance Use Topics    Alcohol use: Yes     Alcohol/week: 2.0 standard drinks of alcohol     Types: 1 Glasses of wine, 1 Shots of liquor per week     Comment: occasional - once or twice in a month will have 1 drink    Drug use: Never          Current Outpatient Medications:     norgestimate-ethinyl estradiol (ESTARYLLA) 0.25-35 MG-MCG per tablet, , Disp: , Rfl:

## 2024-12-12 NOTE — PATIENT INSTRUCTIONS
The ideal exercise is 150 minutes per week (30 minutes for 5 days or 22 minutes every day) of moderate intensity aerobic exercise. Moderate intensity is walking briskly, riding a bike, pushing a , playing doubles tennis. Two days per week of strengthening for major muscles is also recommended.

## 2025-01-09 ENCOUNTER — OFFICE VISIT (OUTPATIENT)
Facility: CLINIC | Age: 50
End: 2025-01-09
Payer: COMMERCIAL

## 2025-01-09 VITALS
HEART RATE: 77 BPM | DIASTOLIC BLOOD PRESSURE: 87 MMHG | SYSTOLIC BLOOD PRESSURE: 131 MMHG | TEMPERATURE: 98.3 F | OXYGEN SATURATION: 97 % | BODY MASS INDEX: 34.58 KG/M2 | HEIGHT: 66 IN | RESPIRATION RATE: 16 BRPM | WEIGHT: 215.2 LBS

## 2025-01-09 DIAGNOSIS — R03.0 ELEVATED BLOOD PRESSURE READING IN OFFICE WITHOUT DIAGNOSIS OF HYPERTENSION: Primary | ICD-10-CM

## 2025-01-09 DIAGNOSIS — E66.812 CLASS 2 OBESITY WITHOUT SERIOUS COMORBIDITY WITH BODY MASS INDEX (BMI) OF 35.0 TO 35.9 IN ADULT, UNSPECIFIED OBESITY TYPE: ICD-10-CM

## 2025-01-09 PROCEDURE — 99214 OFFICE O/P EST MOD 30 MIN: CPT | Performed by: INTERNAL MEDICINE

## 2025-01-09 SDOH — ECONOMIC STABILITY: FOOD INSECURITY: WITHIN THE PAST 12 MONTHS, YOU WORRIED THAT YOUR FOOD WOULD RUN OUT BEFORE YOU GOT MONEY TO BUY MORE.: NEVER TRUE

## 2025-01-09 SDOH — ECONOMIC STABILITY: FOOD INSECURITY: WITHIN THE PAST 12 MONTHS, THE FOOD YOU BOUGHT JUST DIDN'T LAST AND YOU DIDN'T HAVE MONEY TO GET MORE.: NEVER TRUE

## 2025-01-09 ASSESSMENT — PATIENT HEALTH QUESTIONNAIRE - PHQ9
SUM OF ALL RESPONSES TO PHQ9 QUESTIONS 1 & 2: 0
SUM OF ALL RESPONSES TO PHQ QUESTIONS 1-9: 0
2. FEELING DOWN, DEPRESSED OR HOPELESS: NOT AT ALL
1. LITTLE INTEREST OR PLEASURE IN DOING THINGS: NOT AT ALL
SUM OF ALL RESPONSES TO PHQ QUESTIONS 1-9: 0

## 2025-01-09 NOTE — PROGRESS NOTES
A/P:     Diagnosis Orders   1. Elevated blood pressure reading in office without diagnosis of hypertension        2. Class 2 obesity without serious comorbidity with body mass index (BMI) of 35.0 to 35.9 in adult, unspecified obesity type              Assessment & Plan  1. Blood pressure management.  Her home blood pressure readings are within acceptable limits, but the diastolic number is slightly elevated. It is not ideal, but we want it to be 80 or less at home ideally. This should be monitored closely. She is advised to maintain her current exercise routine and adhere to a low-sodium diet. She should continue to monitor her blood pressure at home and report any significant increases.     2. Weight management.  She has lost a few pounds over the holidays, which is commendable. The potential risks associated with phentermine use were discussed, and it was decided to hold off on starting phentermine for now. If she remains motivated and her blood pressure stays stable, a lower dose of phentermine may be considered in the future.             Follow up:  3 months for CPE          An electronic signature was used to authenticate this note.    --Carrie Aragon MD         HPI: Lisa F Diogo is here for a follow up visit:      History of Present Illness  The patient presents for evaluation of blood pressure, weight management, and arthritis.    Her systolic blood pressure at home has been consistently ranging between 117 and 123, while her diastolic readings have fluctuated between 78 and 85. She expresses concern over the elevated diastolic values. She reports no significant changes in her lifestyle since her last visit in December 2024. She maintains a regular exercise regimen, engaging in physical activity several days a week. Her dietary habits include minimal consumption of processed foods, with a preference for adding her own salt to meals. She reports no edema in her lower extremities. She also reports no

## 2025-03-17 DIAGNOSIS — E89.0 HYPOTHYROIDISM FOLLOWING RADIOIODINE THERAPY: ICD-10-CM

## 2025-03-17 RX ORDER — LEVOTHYROXINE SODIUM 150 MCG
TABLET ORAL
Qty: 90 TABLET | Refills: 1 | OUTPATIENT
Start: 2025-03-17

## 2025-03-20 DIAGNOSIS — E89.0 HYPOTHYROIDISM FOLLOWING RADIOIODINE THERAPY: ICD-10-CM

## 2025-03-21 ENCOUNTER — PATIENT MESSAGE (OUTPATIENT)
Facility: CLINIC | Age: 50
End: 2025-03-21

## 2025-03-21 DIAGNOSIS — E89.0 HYPOTHYROIDISM FOLLOWING RADIOIODINE THERAPY: ICD-10-CM

## 2025-03-21 RX ORDER — FLUOCINONIDE 0.5 MG/G
CREAM TOPICAL 2 TIMES DAILY PRN
Qty: 60 G | Refills: 0 | Status: SHIPPED | OUTPATIENT
Start: 2025-03-21

## 2025-03-21 RX ORDER — FLUOCINONIDE 0.5 MG/G
CREAM TOPICAL 2 TIMES DAILY PRN
Qty: 60 G | Refills: 0 | OUTPATIENT
Start: 2025-03-21

## 2025-03-21 RX ORDER — LEVOTHYROXINE SODIUM 150 MCG
150 TABLET ORAL
Qty: 90 TABLET | Refills: 1 | OUTPATIENT
Start: 2025-03-21 | End: 2025-06-19

## 2025-03-21 RX ORDER — LEVOTHYROXINE SODIUM 150 MCG
150 TABLET ORAL
Qty: 90 TABLET | Refills: 3 | Status: SHIPPED | OUTPATIENT
Start: 2025-03-21 | End: 2025-06-19

## 2025-03-21 NOTE — TELEPHONE ENCOUNTER
Patient has changed PCP and practice.  Advised patient via [employer] message.  Thanks, Yolanda    For Pharmacy Admin Tracking Only    Program: Medication Refill  CPA in place:    Recommendation Provided To:   Intervention Detail: Discontinued Rx: 1, reason: Patient Preference  Intervention Accepted By:   Gap Closed?:    Time Spent (min): 5

## 2025-03-21 NOTE — TELEPHONE ENCOUNTER
Patient has changed PCP and practice.  Advised patient via [employer] message to contact MD Aragon.  ThanksYolanda    For Pharmacy Admin Tracking Only    Program: Medication Refill  CPA in place:    Recommendation Provided To:   Intervention Detail: Discontinued Rx: 1, reason: Patient Preference  Intervention Accepted By:   Gap Closed?:    Time Spent (min): 5

## 2025-04-18 ENCOUNTER — OFFICE VISIT (OUTPATIENT)
Facility: CLINIC | Age: 50
End: 2025-04-18
Payer: COMMERCIAL

## 2025-04-18 VITALS
BODY MASS INDEX: 34.52 KG/M2 | SYSTOLIC BLOOD PRESSURE: 126 MMHG | DIASTOLIC BLOOD PRESSURE: 89 MMHG | HEIGHT: 66 IN | OXYGEN SATURATION: 98 % | WEIGHT: 214.8 LBS | HEART RATE: 75 BPM

## 2025-04-18 DIAGNOSIS — Z00.00 ROUTINE HISTORY AND PHYSICAL EXAMINATION OF ADULT: ICD-10-CM

## 2025-04-18 DIAGNOSIS — Z00.00 ROUTINE HISTORY AND PHYSICAL EXAMINATION OF ADULT: Primary | ICD-10-CM

## 2025-04-18 LAB
ALBUMIN SERPL-MCNC: 3.6 G/DL (ref 3.5–5)
ALBUMIN/GLOB SERPL: 1.3 (ref 1.1–2.2)
ALP SERPL-CCNC: 86 U/L (ref 45–117)
ALT SERPL-CCNC: 24 U/L (ref 12–78)
ANION GAP SERPL CALC-SCNC: 6 MMOL/L (ref 2–12)
AST SERPL-CCNC: 23 U/L (ref 15–37)
BILIRUB SERPL-MCNC: 1.1 MG/DL (ref 0.2–1)
BUN SERPL-MCNC: 9 MG/DL (ref 6–20)
BUN/CREAT SERPL: 9 (ref 12–20)
CALCIUM SERPL-MCNC: 8.8 MG/DL (ref 8.5–10.1)
CHLORIDE SERPL-SCNC: 105 MMOL/L (ref 97–108)
CO2 SERPL-SCNC: 28 MMOL/L (ref 21–32)
CREAT SERPL-MCNC: 1 MG/DL (ref 0.55–1.02)
GLOBULIN SER CALC-MCNC: 2.8 G/DL (ref 2–4)
GLUCOSE SERPL-MCNC: 82 MG/DL (ref 65–100)
POTASSIUM SERPL-SCNC: 4.3 MMOL/L (ref 3.5–5.1)
PROT SERPL-MCNC: 6.4 G/DL (ref 6.4–8.2)
SODIUM SERPL-SCNC: 139 MMOL/L (ref 136–145)

## 2025-04-18 PROCEDURE — 99396 PREV VISIT EST AGE 40-64: CPT | Performed by: INTERNAL MEDICINE

## 2025-04-18 NOTE — PROGRESS NOTES
A/P:    1. Routine history and physical examination of adult  Recommended vaccinations are up to date. Colonoscopy is up to date. Mammogram and pap smear are up to date, per gynecology. Lab tests to be done fasting today. Moderate intensity exercise of at least 150 minutes per week recommended.   - Comprehensive Metabolic Panel; Future       Follow up:  6 months for thyroid        An electronic signature was used to authenticate this note.    --Carrie Aragon MD         HPI: Lisa Lind is here for a wellness visit:         Health maintenance: She has been doing well, has few complaints today. She is exercising just with walking during the day, in her work day. Her last pap smear was in September 2023 with negative HPV testing. Mammogram was done in October 2024. She has had a colonoscopy for colon cancer screening in July 2024.    She is fasting for labs today. She had a CBC yesterday with hem/onc--hemoglobin was 12.5.       Patient Active Problem List   Diagnosis    Iron deficiency anemia    Status post gastric bypass for obesity    Hypothyroidism following radioiodine therapy    BMI 40.0-44.9, adult        Past Medical History:   Diagnosis Date    Hypothyroidism 1998    On Medication after Radioactive Iodine    Iron deficiency anemia 10/29/2015    menorrhagia and s/p gastric bypass    Osteoarthritis 04/13/2023    Knee; Saw Dr. Jeremy GRACE [facility]       Past Surgical History:   Procedure Laterality Date    EYE SURGERY  10/15/2024    Lasik    FRACTURE SURGERY  1993    left(pinkie) metacarpus    GASTRIC BYPASS SURGERY  2009    UTERINE FIBROID EMBOLIZATION  07/2022       Family History   Problem Relation Age of Onset    Osteoarthritis Mother     Cancer Father         Colon, liver    Colon Cancer Father     Gout Father     Heart Attack Father     Heart Disease Father         CABG x 3, atrial fibrillation    Hypertension Father     Elevated Lipids Father     Kidney Disease Father     Kidney Disease Maternal

## 2025-04-20 ENCOUNTER — RESULTS FOLLOW-UP (OUTPATIENT)
Facility: CLINIC | Age: 50
End: 2025-04-20